# Patient Record
Sex: MALE | Race: WHITE | NOT HISPANIC OR LATINO | URBAN - METROPOLITAN AREA
[De-identification: names, ages, dates, MRNs, and addresses within clinical notes are randomized per-mention and may not be internally consistent; named-entity substitution may affect disease eponyms.]

---

## 2019-09-11 ENCOUNTER — OUTPATIENT (OUTPATIENT)
Dept: OUTPATIENT SERVICES | Facility: HOSPITAL | Age: 74
LOS: 1 days | Discharge: HOME | End: 2019-09-11
Payer: MEDICARE

## 2019-09-11 LAB
ANION GAP SERPL CALC-SCNC: 14 MMO/L — SIGNIFICANT CHANGE UP (ref 7–14)
BUN SERPL-MCNC: 17 MG/DL — SIGNIFICANT CHANGE UP (ref 7–23)
CALCIUM SERPL-MCNC: 9.8 MG/DL — SIGNIFICANT CHANGE UP (ref 8.4–10.5)
CHLORIDE SERPL-SCNC: 101 MMOL/L — SIGNIFICANT CHANGE UP (ref 98–107)
CO2 SERPL-SCNC: 25 MMOL/L — SIGNIFICANT CHANGE UP (ref 22–31)
CREAT SERPL-MCNC: 0.87 MG/DL — SIGNIFICANT CHANGE UP (ref 0.5–1.3)
GLUCOSE BLDC GLUCOMTR-MCNC: 145 MG/DL — HIGH (ref 70–99)
GLUCOSE SERPL-MCNC: 150 MG/DL — HIGH (ref 70–99)
HBA1C BLD-MCNC: 6.4 % — HIGH (ref 4–5.6)
HCT VFR BLD CALC: 43 % — SIGNIFICANT CHANGE UP (ref 39–50)
HGB BLD-MCNC: 13.9 G/DL — SIGNIFICANT CHANGE UP (ref 13–17)
MCHC RBC-ENTMCNC: 30 PG — SIGNIFICANT CHANGE UP (ref 27–34)
MCHC RBC-ENTMCNC: 32.3 % — SIGNIFICANT CHANGE UP (ref 32–36)
MCV RBC AUTO: 92.9 FL — SIGNIFICANT CHANGE UP (ref 80–100)
NRBC # FLD: 0 K/UL — SIGNIFICANT CHANGE UP (ref 0–0)
PLATELET # BLD AUTO: 243 K/UL — SIGNIFICANT CHANGE UP (ref 150–400)
PMV BLD: 9.6 FL — SIGNIFICANT CHANGE UP (ref 7–13)
POTASSIUM SERPL-MCNC: 3.9 MMOL/L — SIGNIFICANT CHANGE UP (ref 3.5–5.3)
POTASSIUM SERPL-SCNC: 3.9 MMOL/L — SIGNIFICANT CHANGE UP (ref 3.5–5.3)
RBC # BLD: 4.63 M/UL — SIGNIFICANT CHANGE UP (ref 4.2–5.8)
RBC # FLD: 14.3 % — SIGNIFICANT CHANGE UP (ref 10.3–14.5)
SODIUM SERPL-SCNC: 140 MMOL/L — SIGNIFICANT CHANGE UP (ref 135–145)
WBC # BLD: 11.3 K/UL — HIGH (ref 3.8–10.5)
WBC # FLD AUTO: 11.3 K/UL — HIGH (ref 3.8–10.5)

## 2019-09-11 PROCEDURE — 93454 CORONARY ARTERY ANGIO S&I: CPT | Mod: 26

## 2019-09-11 PROCEDURE — 93010 ELECTROCARDIOGRAM REPORT: CPT

## 2019-09-11 RX ORDER — SODIUM CHLORIDE 9 MG/ML
3 INJECTION INTRAMUSCULAR; INTRAVENOUS; SUBCUTANEOUS EVERY 8 HOURS
Refills: 0 | Status: DISCONTINUED | OUTPATIENT
Start: 2019-09-11 | End: 2019-10-05

## 2019-09-11 NOTE — H&P CARDIOLOGY - NEGATIVE CARDIOVASCULAR SYMPTOMS
no claudication/no peripheral edema/no orthopnea/no paroxysmal nocturnal dyspnea/no palpitations/no chest pain

## 2019-09-11 NOTE — H&P CARDIOLOGY - RS GEN PE MLT RESP DETAILS PC
good air movement/breath sounds equal/clear to auscultation bilaterally/airway patent/respirations non-labored/no chest wall tenderness

## 2019-09-11 NOTE — H&P CARDIOLOGY - PMH
Afib    CVA (cerebral vascular accident)    Diabetes    Gout    HLD (hyperlipidemia)    HTN (hypertension)    Obesity

## 2019-09-11 NOTE — H&P CARDIOLOGY - HISTORY OF PRESENT ILLNESS
73 y/o M w/ PMH of Afib on Eliquis, CVA, Obesity, Gout, HTN, HLD and DM presents for cardiac catheretization. Patient was sent for a nuclear stress test showed reversible ischemia in the distal + apical anterior wall region with WMA and EF 44%. 75 y/o M w/ PMH of Afib on Eliquis, CVA(2018), Obesity, Gout, HTN, HLD and DM presents for cardiac catheretization. Pt admits to fatigue + dyspnea on exertion and has multiple risk factors for CAD so he was sent for a stress test. Nuclear stress test which showed reversible ischemia in the distal + apical anterior wall region with WMA and EF 44%. Pt denies N/V/D, fevers, chills, cough, palpitations, chest pain, substernal distress, syncope, orthopnea, nocturnal paroxysmal dyspnea, edema, cyanosis, heart murmurs, varicosities, phlebitis, claudication.

## 2021-05-13 PROBLEM — M10.9 GOUT, UNSPECIFIED: Chronic | Status: ACTIVE | Noted: 2019-09-11

## 2021-05-13 PROBLEM — E66.9 OBESITY, UNSPECIFIED: Chronic | Status: ACTIVE | Noted: 2019-09-11

## 2021-05-13 PROBLEM — I10 ESSENTIAL (PRIMARY) HYPERTENSION: Chronic | Status: ACTIVE | Noted: 2019-09-11

## 2021-05-13 PROBLEM — E11.9 TYPE 2 DIABETES MELLITUS WITHOUT COMPLICATIONS: Chronic | Status: ACTIVE | Noted: 2019-09-11

## 2021-05-13 PROBLEM — E78.5 HYPERLIPIDEMIA, UNSPECIFIED: Chronic | Status: ACTIVE | Noted: 2019-09-11

## 2021-05-13 PROBLEM — I48.91 UNSPECIFIED ATRIAL FIBRILLATION: Chronic | Status: ACTIVE | Noted: 2019-09-11

## 2021-05-13 PROBLEM — I63.9 CEREBRAL INFARCTION, UNSPECIFIED: Chronic | Status: ACTIVE | Noted: 2019-09-11

## 2021-05-26 ENCOUNTER — APPOINTMENT (OUTPATIENT)
Dept: PULMONOLOGY | Facility: CLINIC | Age: 76
End: 2021-05-26
Payer: MEDICARE

## 2021-05-26 VITALS
HEART RATE: 80 BPM | WEIGHT: 265 LBS | SYSTOLIC BLOOD PRESSURE: 141 MMHG | TEMPERATURE: 97.1 F | DIASTOLIC BLOOD PRESSURE: 83 MMHG | OXYGEN SATURATION: 94 % | RESPIRATION RATE: 14 BRPM | BODY MASS INDEX: 37.94 KG/M2 | HEIGHT: 70 IN

## 2021-05-26 DIAGNOSIS — I50.9 HEART FAILURE, UNSPECIFIED: ICD-10-CM

## 2021-05-26 DIAGNOSIS — I10 ESSENTIAL (PRIMARY) HYPERTENSION: ICD-10-CM

## 2021-05-26 DIAGNOSIS — U09.9 POST COVID-19 CONDITION, UNSPECIFIED: ICD-10-CM

## 2021-05-26 DIAGNOSIS — Z82.49 FAMILY HISTORY OF ISCHEMIC HEART DISEASE AND OTHER DISEASES OF THE CIRCULATORY SYSTEM: ICD-10-CM

## 2021-05-26 DIAGNOSIS — J84.10 PULMONARY FIBROSIS, UNSPECIFIED: ICD-10-CM

## 2021-05-26 DIAGNOSIS — R06.02 SHORTNESS OF BREATH: ICD-10-CM

## 2021-05-26 DIAGNOSIS — Z87.891 PERSONAL HISTORY OF NICOTINE DEPENDENCE: ICD-10-CM

## 2021-05-26 PROCEDURE — 95012 NITRIC OXIDE EXP GAS DETER: CPT

## 2021-05-26 PROCEDURE — 94060 EVALUATION OF WHEEZING: CPT

## 2021-05-26 PROCEDURE — 99214 OFFICE O/P EST MOD 30 MIN: CPT | Mod: 25

## 2021-05-26 PROCEDURE — 94727 GAS DIL/WSHOT DETER LNG VOL: CPT

## 2021-05-26 NOTE — REASON FOR VISIT
[Consultation] : a consultation [Cough] : cough [Pulmonary Fibrosis] : pulmonary fibrosis [Shortness of Breath] : shortness of breath

## 2021-05-26 NOTE — REVIEW OF SYSTEMS
[Fatigue] : fatigue [Cough] : cough [Hemoptysis] : no hemoptysis [Sputum] : no sputum [Dyspnea] : dyspnea [A.M. Dry Mouth] : no a.m. dry mouth [SOB on Exertion] : sob on exertion [Negative] : Endocrine

## 2021-05-26 NOTE — ASSESSMENT
[FreeTextEntry1] : In summary the patient is a 75-year-old obese male with past medical history significant for atrial fibrillation, hypertension, high cholesterol, diabetes who was referred today for an abnormal CT of the chest.  The patient's physical exam is within normal limits I reviewed the patient's CAT scan report although I am unable to open his study online.  I have requested that the disc be sent to me I will attempt to revisit his CAT scan in the morning.  In all likelihood this fibrosis is secondary to his COVID-19 pneumonia.  The patient underwent a pulmonary function test which revealed restrictive lung disease.  The patient is instructed to continue current therapy and follow-up on a as needed basis.  He may also suffer from obstructive sleep apnea although he denies any symptoms

## 2021-05-26 NOTE — HISTORY OF PRESENT ILLNESS
[Former] : former [Never] : never [TextBox_4] : Patient is a 75-year-old obese male with past medical history significant for COVID-19 pneumonia, atrial fibrillation currently on Eliquis, congestive heart failure, hypertension, diabetes who is referred today for pulmonary consult.  The patient complains of shortness of breath and dyspnea on exertion.  He states this is improved with the increase in his diuretic.  Patient underwent a CT of the chest which was reported as pulmonary fibrosis and he was referred for further management.  He currently denies fevers chills chest pain weight loss or hemoptysis [TextBox_11] : 2

## 2021-10-06 PROBLEM — U09.9 POST-COVID-19 SYNDROME: Status: ACTIVE | Noted: 2021-05-26

## 2022-11-17 ENCOUNTER — EMERGENCY (EMERGENCY)
Facility: HOSPITAL | Age: 77
LOS: 0 days | Discharge: NURSING, REHAB OR LONG-TERM CARE | End: 2022-11-18
Admitting: EMERGENCY MEDICINE

## 2022-11-17 ENCOUNTER — INPATIENT (INPATIENT)
Facility: HOSPITAL | Age: 77
LOS: 0 days | Discharge: HOME | End: 2022-11-18
Attending: INTERNAL MEDICINE | Admitting: INTERNAL MEDICINE

## 2022-11-17 VITALS
SYSTOLIC BLOOD PRESSURE: 153 MMHG | DIASTOLIC BLOOD PRESSURE: 80 MMHG | OXYGEN SATURATION: 99 % | RESPIRATION RATE: 18 BRPM | TEMPERATURE: 98 F | WEIGHT: 149.91 LBS | HEART RATE: 88 BPM

## 2022-11-17 DIAGNOSIS — I48.91 UNSPECIFIED ATRIAL FIBRILLATION: ICD-10-CM

## 2022-11-17 DIAGNOSIS — I50.32 CHRONIC DIASTOLIC (CONGESTIVE) HEART FAILURE: ICD-10-CM

## 2022-11-17 DIAGNOSIS — Z87.19 PERSONAL HISTORY OF OTHER DISEASES OF THE DIGESTIVE SYSTEM: ICD-10-CM

## 2022-11-17 DIAGNOSIS — I11.0 HYPERTENSIVE HEART DISEASE WITH HEART FAILURE: ICD-10-CM

## 2022-11-17 DIAGNOSIS — Z79.01 LONG TERM (CURRENT) USE OF ANTICOAGULANTS: ICD-10-CM

## 2022-11-17 DIAGNOSIS — N39.0 URINARY TRACT INFECTION, SITE NOT SPECIFIED: ICD-10-CM

## 2022-11-17 DIAGNOSIS — R47.1 DYSARTHRIA AND ANARTHRIA: ICD-10-CM

## 2022-11-17 DIAGNOSIS — D64.9 ANEMIA, UNSPECIFIED: ICD-10-CM

## 2022-11-17 DIAGNOSIS — R47.81 SLURRED SPEECH: ICD-10-CM

## 2022-11-17 DIAGNOSIS — K86.1 OTHER CHRONIC PANCREATITIS: ICD-10-CM

## 2022-11-17 DIAGNOSIS — Z79.84 LONG TERM (CURRENT) USE OF ORAL HYPOGLYCEMIC DRUGS: ICD-10-CM

## 2022-11-17 DIAGNOSIS — E11.9 TYPE 2 DIABETES MELLITUS WITHOUT COMPLICATIONS: ICD-10-CM

## 2022-11-17 DIAGNOSIS — R77.8 OTHER SPECIFIED ABNORMALITIES OF PLASMA PROTEINS: ICD-10-CM

## 2022-11-17 DIAGNOSIS — I69.328 OTHER SPEECH AND LANGUAGE DEFICITS FOLLOWING CEREBRAL INFARCTION: ICD-10-CM

## 2022-11-17 DIAGNOSIS — Z20.822 CONTACT WITH AND (SUSPECTED) EXPOSURE TO COVID-19: ICD-10-CM

## 2022-11-17 LAB
APTT BLD: 34.4 SEC — SIGNIFICANT CHANGE UP (ref 27–39.2)
BASOPHILS # BLD AUTO: 0.07 K/UL — SIGNIFICANT CHANGE UP (ref 0–0.2)
BASOPHILS NFR BLD AUTO: 0.8 % — SIGNIFICANT CHANGE UP (ref 0–1)
EOSINOPHIL # BLD AUTO: 0.44 K/UL — SIGNIFICANT CHANGE UP (ref 0–0.7)
EOSINOPHIL NFR BLD AUTO: 4.8 % — SIGNIFICANT CHANGE UP (ref 0–8)
HCT VFR BLD CALC: 31.7 % — LOW (ref 42–52)
HGB BLD-MCNC: 10.3 G/DL — LOW (ref 14–18)
IMM GRANULOCYTES NFR BLD AUTO: 0.7 % — HIGH (ref 0.1–0.3)
INR BLD: 1.61 RATIO — HIGH (ref 0.65–1.3)
LYMPHOCYTES # BLD AUTO: 2.77 K/UL — SIGNIFICANT CHANGE UP (ref 1.2–3.4)
LYMPHOCYTES # BLD AUTO: 30.1 % — SIGNIFICANT CHANGE UP (ref 20.5–51.1)
MCHC RBC-ENTMCNC: 27.6 PG — SIGNIFICANT CHANGE UP (ref 27–31)
MCHC RBC-ENTMCNC: 32.5 G/DL — SIGNIFICANT CHANGE UP (ref 32–37)
MCV RBC AUTO: 85 FL — SIGNIFICANT CHANGE UP (ref 80–94)
MONOCYTES # BLD AUTO: 0.76 K/UL — HIGH (ref 0.1–0.6)
MONOCYTES NFR BLD AUTO: 8.3 % — SIGNIFICANT CHANGE UP (ref 1.7–9.3)
NEUTROPHILS # BLD AUTO: 5.09 K/UL — SIGNIFICANT CHANGE UP (ref 1.4–6.5)
NEUTROPHILS NFR BLD AUTO: 55.3 % — SIGNIFICANT CHANGE UP (ref 42.2–75.2)
NRBC # BLD: 0 /100 WBCS — SIGNIFICANT CHANGE UP (ref 0–0)
PLATELET # BLD AUTO: 288 K/UL — SIGNIFICANT CHANGE UP (ref 130–400)
PROTHROM AB SERPL-ACNC: 18.6 SEC — HIGH (ref 9.95–12.87)
RBC # BLD: 3.73 M/UL — LOW (ref 4.7–6.1)
RBC # FLD: 20 % — HIGH (ref 11.5–14.5)
WBC # BLD: 9.19 K/UL — SIGNIFICANT CHANGE UP (ref 4.8–10.8)
WBC # FLD AUTO: 9.19 K/UL — SIGNIFICANT CHANGE UP (ref 4.8–10.8)

## 2022-11-17 PROCEDURE — 70498 CT ANGIOGRAPHY NECK: CPT | Mod: 26,MA

## 2022-11-17 PROCEDURE — 70450 CT HEAD/BRAIN W/O DYE: CPT | Mod: 26,MA

## 2022-11-17 PROCEDURE — 0042T: CPT | Mod: MA

## 2022-11-17 PROCEDURE — 99285 EMERGENCY DEPT VISIT HI MDM: CPT

## 2022-11-17 PROCEDURE — 70496 CT ANGIOGRAPHY HEAD: CPT | Mod: 26,MA

## 2022-11-17 PROCEDURE — 93010 ELECTROCARDIOGRAM REPORT: CPT

## 2022-11-17 NOTE — ED PROVIDER NOTE - NS ED ROS FT
Constitutional: No fevers, chills, or malaise.  HEENT: No headache, visual changes   Cardiac:  No chest pain, SOB   Respiratory:  No cough, respiratory distress, or hemoptysis.  GI:  No nausea, vomiting, diarrhea, or abdominal pain.  :  No dysuria, frequency, or urgency.  MS:  No myalgia, muscle weakness, joint pain or back pain.  Neuro:  Reports slurred speech. No dizziness, LOC, paralysis, or N/T.  Skin:  No skin rash.   Endocrine: No polyuria, polyphagia, or polydipsia.

## 2022-11-17 NOTE — CONSULT NOTE ADULT - SUBJECTIVE AND OBJECTIVE BOX
Stroke CODE CONSULT Note:    GEORGINA PAUL    1. Chief Complaint:    HPI: 77Y male with PMHx of ischemic strokes (last one in 2022 residual slurred speech), afib on Eliquis, history of GI bleed, DM BIBEMS for slurred speech. Patient lives at [facility]. ED attending called wife and staff, patient was seen normal around ~6-7pm for dinner time. Patient went to shower ?7PM and was placed in the chair and fell asleep. Around ~830PM, patient was noted to have worsening slurred speech. LKW was unclear. Patient also took his Eliquis dose at 5pm, confirmed with facility.       2. Relevant PMH:   Prior ischemic stroke/TIA[ ], Afib [ ], CAD [ ], HTN [ ], DLD [ ], DM [ ], PVD [ ], Obesity [ ],   Sedentary lifestyle [ ], CHF [ ], PRABHAKAR [ ], Cancer Hx [ ].    3. Social History: Smoking [ ], Drug Use [ ], Alcohol Use [ ], Other [ ]    4. Possible Location of Stroke:    5. Relevant Brain Tissue Imaging:  < from: CT Brain Stroke Protocol (22 @ 22:34) >  IMPRESSION:    No evidence of acute intracranial hemorrhage or large territorial infarct.    Mild/moderate chronic microvascular changes.    Findings were discussed by Dr. James Clifton with Dr. Jacobson at 2022   10:44 PM.    < end of copied text >    6. Relevant Cerebrovascular Imaging:    < from: CT Angio Neck Stroke Protocol w/ IV Cont (22 @ 23:00) >  IMPRESSION:    68 mL perfusion abnormality scattered throughout the bilateral   cerebellar, bilateral occipital, bilateral frontal, and left parietal  regions. Likely artifactual given lack of vascular territorial   distribution and abnormal perfusion curves.    No intracranial large vessel filling defect or small aneurysm.    Mild stenosis of right internal cervical carotid artery origin.        ******PRELIMINARY REPORT******      ******PRELIMINARY REPORT******    < end of copied text >              7. Relevant blood tests:                          10.3   9.19  )-----------( 288      ( 2022 23:16 )             31.7     8. Relevant cardiac rhythm monitorin. Relevant Cardiac Structure: (TTE/FABIÁN +/-):[ ]No intracardiac thrombus/[ ] no vegetation/[ ]no akynesia/EF:    Home Medications:      MEDICATIONS  (STANDING):      10. PT/OT/Speech/Rehab/S&Sw/ Cognitive eval results and recommendations:    11. Exam:    Vital Signs Last 24 Hrs  T(C): 36.6 (2022 22:07), Max: 36.6 (2022 22:07)  T(F): 97.8 (2022 22:07), Max: 97.8 (2022 22:07)  HR: 88 (:07) (88 - 88)  BP: 153/80 (2022 22:07) (153/80 - 153/80)  BP(mean): --  RR: 18 (2022 22:07) (18 - 18)  SpO2: 99% (2022 22:07) (99% - 99%)    Parameters below as of 2022 22:07  Patient On (Oxygen Delivery Method): room air        12.   NIH STROKE SCALE  Item	                                                        Score  1 a.	Level of Consciousness	               	0  1 b. LOC Questions	                                0  1 c.	LOC Commands	                               	0  2.	Best Gaze	                                        0  3.	Visual	                                                0  4.	Facial Palsy	                                        0  5 a.	Motor Arm - Left	                                0  5 b.	Motor Arm - Right	                        0  6 a.	Motor Leg - Left	                                0  6 b.	Motor Leg - Right	                                0  7.	Limb Ataxia	                                        0  8.	Sensory	                                                0  9.	Language	                                        0  10.	Dysarthria	                                        1-->0  11.	Extinction and Inattention  	        0  ______________________________________  TOTAL	                                                        0    Total NIHSS on admission:      NIHSS yesterday:      NIHSS today: 0

## 2022-11-17 NOTE — ED PROVIDER NOTE - CLINICAL SUMMARY MEDICAL DECISION MAKING FREE TEXT BOX
slurred speechk fatigue, stroke code. not tpa candidate  NIHSS zero.   elevated trop. in afib (known). no cp or sob  admitted to tele for serial trop, cardiac eval and MRI  neuro to follow.

## 2022-11-17 NOTE — ED PROVIDER NOTE - PROGRESS NOTE DETAILS
leeannk: d/w Dr Martinez from telestroke. given LKN unclear if from 6pm-730pm, sxs improving, took AC around 530-6pm today and NIHSS 0, does not feel tpa candidate. I agree. plan for labs, CTs and reassess. Suzy: per MAR, medicine team will f/u UA and CXR. Admitted to [facility] for elevated trop

## 2022-11-17 NOTE — ED PROVIDER NOTE - OBJECTIVE STATEMENT
76 yo male w/ PMH of afib on eliquis, DM, GI bleed, stroke vs TIA in august presents for slurred speech.  Per EMS, last known well 2030 but speaking to assisted living facility, wife, and pt, last well known unclear.  Per pt, when he woke up from sleep at 2030, noticed speech was slurred.  No fevers, paralysis, or trauma.

## 2022-11-17 NOTE — ED PROVIDER NOTE - PHYSICAL EXAMINATION
GENERAL: NAD   SKIN: warm, dry  HEAD: Normocephalic; atraumatic.  EYES: PERRLA, EOMI, no conjunctival erythema  CARD: S1, S2 normal; no murmurs, gallops, or rubs. Regular rate and rhythm.   RESP: LCTAB; No wheezes, rales, rhonchi, or stridor.  ABD: soft, nontender, and nondistended  NEURO: Alert, oriented. Mild dysarthria noted. Strength 5/5 in bilateral UE and LEs, sensation intact. No facial droop. Finger nose finger and rapid alternating movements WNL. No pronator drift. NIH 1   PSYCH: Cooperative, appropriate.

## 2022-11-17 NOTE — CHART NOTE - NSCHARTNOTEFT_GEN_A_CORE
77 year old male w/ PMH of Afib on Eliquis, Ischemic stroke in August 2022, presents with slurred speech. Patient was reportedly in usual state of health at 1800 11/17 when he was laying in his recliner as witnessed by health aid. Shortly thereafter, he was noted to have slurring of his speech which has improved en route to the hospital but not quite back to baseline. NIHSS 1 (mild dysarthria). CTH (to my eye) no acute pathology; Chronic L. anterior limb of internal capsule infarction; Chronic microvascular changes. CTA h/n (to my eye) negative for any stenoocclusive disease. CTP indeterminate, but likely artifactual.     No tpa as outside window and on Eliquis (last dose this evening at 5pm)   No MT as no LVO    Impression: Isolated dysarthria which is non localizing, likely secondary to acute ischemic stroke. Mechanism unkonwn due to incomplete workup.     [facility]

## 2022-11-17 NOTE — ED PROVIDER NOTE - ATTENDING CONTRIBUTION TO CARE
77-year-old male with a past medical history of atrial fibrillation on anticoagulation, last took Eliquis around 5:36 PM today, diabetes, GI bleed in June of this year, TIA versus stroke in August of this year presents as stroke code.  Per EMS last known normal 830 but unclear last known normal when speaking to his assisted living facility and wife.  Came back from dinner around 6-7 and took a shower, was placed in his chair after that and at some point fell asleep.  When he woke up around 830 to 9 PM was slurring his speech and not his normal self per the wife.  Unclear when everything changed and she is unsure of the exact timelines.  On exam patient states that his speech is improved, NIH stroke scale 0 for me, vital signs stable otherwise, nontoxic-appearing.  No ICH or acute stroke on CT noncontrast.  CTAs and labs pending.  CT progress note for further details regarding communication with telestroke.  Disposition pending work-up and reassessment.

## 2022-11-17 NOTE — STROKE CODE NOTE - ABCD2 HIDDEN
I spoke with patient reminding him to have blood drawn tomorrow. He states he had blood drawn yesterday. He will await instructions from that.
show

## 2022-11-17 NOTE — CONSULT NOTE ADULT - NS ATTEND AMEND GEN_ALL_CORE FT
I have personally seen and examined this patient.  I have fully participated in the care of this patient.  I have reviewed all pertinent clinical information, including history, physical exam, plan and note. : 77Y male with PMHx of ischemic strokes (last one in August 2022 residual slurred speech), afib on Eliquis, history of GI bleed, DM presented with worsening slurred speech. Overnight Brain MRI showed no acute infarct. UA positive. Likely exacerbation of prior stroke symptoms due to UTI. Continue Eliquis. No further work up from neurology standpoint.    I have reviewed all pertinent clinical information and reviewed all relevant imaging and diagnostic studies personally.  Recommendations as above.  Agree with above assessment except as noted.

## 2022-11-17 NOTE — CONSULT NOTE ADULT - ASSESSMENT
Assessment: 77Y male with PMHx of ischemic strokes (last one in August 2022 residual slurred speech), afib on Eliquis, history of GI bleed, DM BIBEMS for slurred speech. Patient lives at [facility]. ED attending called wife and staff, patient was seen normal around ~6-7pm for dinner time. Patient went to shower ?7PM and was placed in the chair and fell asleep. Around ~830PM, patient was noted to have worsening slurred speech. LKW was unclear. Patient also took his Eliquis dose at 5pm, confirmed with facility. . CTH negative for acute findings. CTA- no flow limiting stenosis or aneurysms. CTP-68 mL perfusion abnormality scattered throughout the bilateral cerebellar, bilateral occipital, bilateral frontal, and left parietalregions. Likely artifactual given lack of vascular territorial distribution and abnormal perfusion curves. Patient's symptoms were rapidly improving, patient c/o of dry mouth. Discussed case with CTC attending, Dr. Martinez, patient is not a tpa candidate as LKW is unclear and patient took his Eliquis dose (abnormal coags).       Suggestions:  Infectious workup- CBC, Chest X RAY, UA   Dysphagia screen  Continue home meds  MRI brain contrast  rEEG  Patient can go to ed obs from neuro standpoint     **Pending attending note to follow below for final reccomendatios** Assessment: 77Y male with PMHx of ischemic strokes (last one in August 2022 residual slurred speech), afib on Eliquis, history of GI bleed, DM BIBEMS for slurred speech. Patient lives at [facility]. ED attending called wife and staff, patient was seen normal around ~6-7pm for dinner time. Patient went to shower ?7PM and was placed in the chair and fell asleep. Around ~830PM, patient was noted to have worsening slurred speech. LKW was unclear. Patient also took his Eliquis dose at 5pm, confirmed with facility. . CTH negative for acute findings. CTA- no flow limiting stenosis or aneurysms. CTP-68 mL perfusion abnormality scattered throughout the bilateral cerebellar, bilateral occipital, bilateral frontal, and left parietalregions. Likely artifactual given lack of vascular territorial distribution and abnormal perfusion curves. Patient's symptoms were rapidly improving, patient c/o of dry mouth. Discussed case with CTC attending, Dr. Martinez, patient is not a tpa candidate as LKW is unclear and patient took his Eliquis dose (abnormal coags).       Suggestions:  Infectious workup- CBC, Chest X RAY, UA   Dysphagia screen  Continue home meds  MRI brain contrast  rEEG  q4 neurochecks  Patient can go to ed obs from neuro standpoint     **Pending attending note to follow below for final reccomendatios** Assessment: 77Y male with PMHx of ischemic strokes (last one in August 2022 residual slurred speech), afib on Eliquis, history of GI bleed, DM BIBEMS for slurred speech. Patient lives at [facility]. ED attending called wife and staff, patient was seen normal around ~6-7pm for dinner time. Patient went to shower ?7PM and was placed in the chair and fell asleep. Around ~830PM, patient was noted to have worsening slurred speech. LKW was unclear. Patient also took his Eliquis dose at 5pm, confirmed with facility. . CTH negative for acute findings. CTA- no flow limiting stenosis or aneurysms. CTP-68 mL perfusion abnormality scattered throughout the bilateral cerebellar, bilateral occipital, bilateral frontal, and left parietalregions. Likely artifactual given lack of vascular territorial distribution and abnormal perfusion curves. Patient's symptoms were rapidly improving, patient c/o of dry mouth. Discussed case with CTC attending, Dr. Martinez, patient is not a tpa candidate as LKW is unclear and patient took his Eliquis dose (abnormal coags).       Suggestions:  Infectious workup- CBC, Chest X RAY, UA   Dysphagia screen  Continue home meds  MRI brain contrast  Patient can go to ed obs from neuro standpoint     **Pending attending note to follow below for final reccomendatios** Assessment: 77Y male with PMHx of ischemic strokes (last one in August 2022 residual slurred speech), afib on Eliquis, history of GI bleed, DM BIBEMS for slurred speech. Patient lives at [facility]. ED attending called wife and staff, patient was seen normal around ~6-7pm for dinner time. Patient went to shower ?7PM and was placed in the chair and fell asleep. Around ~830PM, patient was noted to have worsening slurred speech. LKW was unclear. Patient also took his Eliquis dose at 5pm, confirmed with facility. . CTH negative for acute findings. CTA- no flow limiting stenosis or aneurysms. CTP-68 mL perfusion abnormality scattered throughout the bilateral cerebellar, bilateral occipital, bilateral frontal, and left parietalregions. Likely artifactual given lack of vascular territorial distribution and abnormal perfusion curves. Patient's symptoms were rapidly improving, patient c/o of dry mouth. Discussed case with CTC attending, Dr. Martinez, patient is not a tpa candidate as LKW is unclear and patient took his Eliquis dose (abnormal coags).     #Could be related to recrudescence of old stroke    Suggestions:  Infectious workup- CBC, Chest X RAY, UA   Cardiac work up for elevated troponin  Dysphagia screen  Continue home meds  MRI brain contrast  Patient can go to ed obs from neuro standpoint     **Pending attending note to follow below for final reccomendatios** Assessment: 77Y male with PMHx of ischemic strokes (last one in August 2022 residual slurred speech), afib on Eliquis, history of GI bleed, DM BIBEMS for slurred speech. Patient lives at [facility]. ED attending called wife and staff, patient was seen normal around ~6-7pm for dinner time. Patient went to shower ?7PM and was placed in the chair and fell asleep. Around ~830PM, patient was noted to have worsening slurred speech. LKW was unclear. Patient also took his Eliquis dose at 5pm, confirmed with facility. . CTH negative for acute findings. CTA- no flow limiting stenosis or aneurysms. CTP-68 mL perfusion abnormality scattered throughout the bilateral cerebellar, bilateral occipital, bilateral frontal, and left parietalregions. Likely artifactual given lack of vascular territorial distribution and abnormal perfusion curves. Patient's symptoms were rapidly improving, patient c/o of dry mouth. Discussed case with CTC attending, Dr. Martinez, patient is not a tpa candidate as LKW is unclear and patient took his Eliquis dose (abnormal coags).     #Could be related to recrudescence of old stroke    Suggestions:  Infectious workup- CBC, Chest X RAY, UA   Cardiac work up for elevated troponin  Dysphagia screen  Continue home meds  MRI brain contrast  Discussed with Dr. Ramirez on call  Patient can go to ed obs from neuro standpoint     **Pending neurology attending note to follow below for final recommendations Assessment: 77Y male with PMHx of ischemic strokes (last one in August 2022 residual slurred speech), afib on Eliquis, history of GI bleed, DM BIBEMS for slurred speech. Patient lives at [facility]. ED attending called wife and staff, patient was seen normal around ~6-7pm for dinner time. Patient went to shower ?7PM and was placed in the chair and fell asleep. Around ~830PM, patient was noted to have worsening slurred speech. LKW was unclear. Patient also took his Eliquis dose at 5pm, confirmed with facility. . CTH negative for acute findings. CTA- no flow limiting stenosis or aneurysms. CTP-68 mL perfusion abnormality scattered throughout the bilateral cerebellar, bilateral occipital, bilateral frontal, and left parietalregions. Likely artifactual given lack of vascular territorial distribution and abnormal perfusion curves. Patient's symptoms were rapidly improving, patient c/o of dry mouth. Discussed case with CTC attending, Dr. Martinez, patient is not a tpa candidate as LKW is unclear and patient took his Eliquis dose (abnormal coags).     #Could be related to recrudescence of old stroke    Suggestions:  Infectious workup- CBC, Chest X RAY, UA   Cardiac work up for elevated troponin  Dysphagia screen  Continue home meds  MRI brain contrast  Discussed with Dr. aRmirez on call  Patient can go to ed obs from neuro standpoint

## 2022-11-17 NOTE — ED PROVIDER NOTE - INTERPRETATION
Irregularly irregular, atrial fibrillation, frequent PVCs, normal axis, slight T wave abnormalities throughout but no ST depressions or elevation.  QTc 472, QRS 88.  Rate controlled.

## 2022-11-18 ENCOUNTER — TRANSCRIPTION ENCOUNTER (OUTPATIENT)
Age: 77
End: 2022-11-18

## 2022-11-18 VITALS
RESPIRATION RATE: 18 BRPM | TEMPERATURE: 98 F | OXYGEN SATURATION: 97 % | DIASTOLIC BLOOD PRESSURE: 75 MMHG | HEART RATE: 85 BPM | SYSTOLIC BLOOD PRESSURE: 157 MMHG

## 2022-11-18 LAB
A1C WITH ESTIMATED AVERAGE GLUCOSE RESULT: 6.3 % — HIGH (ref 4–5.6)
ALBUMIN SERPL ELPH-MCNC: 2.9 G/DL — LOW (ref 3.5–5.2)
ALBUMIN SERPL ELPH-MCNC: 3.5 G/DL — SIGNIFICANT CHANGE UP (ref 3.5–5.2)
ALP SERPL-CCNC: 107 U/L — SIGNIFICANT CHANGE UP (ref 30–115)
ALP SERPL-CCNC: 53 U/L — SIGNIFICANT CHANGE UP (ref 30–115)
ALT FLD-CCNC: 6 U/L — SIGNIFICANT CHANGE UP (ref 0–41)
ALT FLD-CCNC: 7 U/L — SIGNIFICANT CHANGE UP (ref 0–41)
ANION GAP SERPL CALC-SCNC: 10 MMOL/L — SIGNIFICANT CHANGE UP (ref 7–14)
ANION GAP SERPL CALC-SCNC: 12 MMOL/L — SIGNIFICANT CHANGE UP (ref 7–14)
APPEARANCE UR: ABNORMAL
AST SERPL-CCNC: 13 U/L — SIGNIFICANT CHANGE UP (ref 0–41)
AST SERPL-CCNC: 14 U/L — SIGNIFICANT CHANGE UP (ref 0–41)
BACTERIA # UR AUTO: NEGATIVE — SIGNIFICANT CHANGE UP
BASOPHILS # BLD AUTO: 0.07 K/UL — SIGNIFICANT CHANGE UP (ref 0–0.2)
BASOPHILS NFR BLD AUTO: 0.9 % — SIGNIFICANT CHANGE UP (ref 0–1)
BILIRUB SERPL-MCNC: 0.3 MG/DL — SIGNIFICANT CHANGE UP (ref 0.2–1.2)
BILIRUB SERPL-MCNC: 0.3 MG/DL — SIGNIFICANT CHANGE UP (ref 0.2–1.2)
BILIRUB UR-MCNC: NEGATIVE — SIGNIFICANT CHANGE UP
BUN SERPL-MCNC: 17 MG/DL — SIGNIFICANT CHANGE UP (ref 10–20)
BUN SERPL-MCNC: 20 MG/DL — SIGNIFICANT CHANGE UP (ref 10–20)
CALCIUM SERPL-MCNC: 8.5 MG/DL — SIGNIFICANT CHANGE UP (ref 8.4–10.5)
CALCIUM SERPL-MCNC: 8.8 MG/DL — SIGNIFICANT CHANGE UP (ref 8.4–10.5)
CHLORIDE SERPL-SCNC: 105 MMOL/L — SIGNIFICANT CHANGE UP (ref 98–110)
CHLORIDE SERPL-SCNC: 108 MMOL/L — SIGNIFICANT CHANGE UP (ref 98–110)
CHOLEST SERPL-MCNC: 99 MG/DL — SIGNIFICANT CHANGE UP
CO2 SERPL-SCNC: 26 MMOL/L — SIGNIFICANT CHANGE UP (ref 17–32)
CO2 SERPL-SCNC: 27 MMOL/L — SIGNIFICANT CHANGE UP (ref 17–32)
COLOR SPEC: COLORLESS — SIGNIFICANT CHANGE UP
CREAT SERPL-MCNC: 1.3 MG/DL — SIGNIFICANT CHANGE UP (ref 0.7–1.5)
CREAT SERPL-MCNC: 1.3 MG/DL — SIGNIFICANT CHANGE UP (ref 0.7–1.5)
DIFF PNL FLD: NEGATIVE — SIGNIFICANT CHANGE UP
EGFR: 56 ML/MIN/1.73M2 — LOW
EGFR: 56 ML/MIN/1.73M2 — LOW
EOSINOPHIL # BLD AUTO: 0.48 K/UL — SIGNIFICANT CHANGE UP (ref 0–0.7)
EOSINOPHIL NFR BLD AUTO: 5.8 % — SIGNIFICANT CHANGE UP (ref 0–8)
EPI CELLS # UR: 0 /HPF — SIGNIFICANT CHANGE UP (ref 0–5)
ESTIMATED AVERAGE GLUCOSE: 134 MG/DL — HIGH (ref 68–114)
GLUCOSE BLDC GLUCOMTR-MCNC: 115 MG/DL — HIGH (ref 70–99)
GLUCOSE BLDC GLUCOMTR-MCNC: 144 MG/DL — HIGH (ref 70–99)
GLUCOSE SERPL-MCNC: 138 MG/DL — HIGH (ref 70–99)
GLUCOSE SERPL-MCNC: 99 MG/DL — SIGNIFICANT CHANGE UP (ref 70–99)
GLUCOSE UR QL: NEGATIVE — SIGNIFICANT CHANGE UP
HCT VFR BLD CALC: 30.2 % — LOW (ref 42–52)
HDLC SERPL-MCNC: 42 MG/DL — SIGNIFICANT CHANGE UP
HGB BLD-MCNC: 9.3 G/DL — LOW (ref 14–18)
HYALINE CASTS # UR AUTO: 0 /LPF — SIGNIFICANT CHANGE UP (ref 0–7)
IMM GRANULOCYTES NFR BLD AUTO: 0.5 % — HIGH (ref 0.1–0.3)
IRON SATN MFR SERPL: 14 % — LOW (ref 15–50)
IRON SATN MFR SERPL: 32 UG/DL — LOW (ref 35–150)
KETONES UR-MCNC: NEGATIVE — SIGNIFICANT CHANGE UP
LEUKOCYTE ESTERASE UR-ACNC: ABNORMAL
LIPID PNL WITH DIRECT LDL SERPL: 38 MG/DL — SIGNIFICANT CHANGE UP
LYMPHOCYTES # BLD AUTO: 2.11 K/UL — SIGNIFICANT CHANGE UP (ref 1.2–3.4)
LYMPHOCYTES # BLD AUTO: 25.7 % — SIGNIFICANT CHANGE UP (ref 20.5–51.1)
MCHC RBC-ENTMCNC: 26.6 PG — LOW (ref 27–31)
MCHC RBC-ENTMCNC: 30.8 G/DL — LOW (ref 32–37)
MCV RBC AUTO: 86.3 FL — SIGNIFICANT CHANGE UP (ref 80–94)
MONOCYTES # BLD AUTO: 0.78 K/UL — HIGH (ref 0.1–0.6)
MONOCYTES NFR BLD AUTO: 9.5 % — HIGH (ref 1.7–9.3)
NEUTROPHILS # BLD AUTO: 4.73 K/UL — SIGNIFICANT CHANGE UP (ref 1.4–6.5)
NEUTROPHILS NFR BLD AUTO: 57.6 % — SIGNIFICANT CHANGE UP (ref 42.2–75.2)
NITRITE UR-MCNC: NEGATIVE — SIGNIFICANT CHANGE UP
NON HDL CHOLESTEROL: 57 MG/DL — SIGNIFICANT CHANGE UP
NRBC # BLD: 0 /100 WBCS — SIGNIFICANT CHANGE UP (ref 0–0)
PH UR: 6.5 — SIGNIFICANT CHANGE UP (ref 5–8)
PLATELET # BLD AUTO: 282 K/UL — SIGNIFICANT CHANGE UP (ref 130–400)
POTASSIUM SERPL-MCNC: 3.9 MMOL/L — SIGNIFICANT CHANGE UP (ref 3.5–5)
POTASSIUM SERPL-MCNC: 4.2 MMOL/L — SIGNIFICANT CHANGE UP (ref 3.5–5)
POTASSIUM SERPL-SCNC: 3.9 MMOL/L — SIGNIFICANT CHANGE UP (ref 3.5–5)
POTASSIUM SERPL-SCNC: 4.2 MMOL/L — SIGNIFICANT CHANGE UP (ref 3.5–5)
PROT SERPL-MCNC: 5.8 G/DL — LOW (ref 6–8)
PROT SERPL-MCNC: 6.4 G/DL — SIGNIFICANT CHANGE UP (ref 6–8)
PROT UR-MCNC: NEGATIVE — SIGNIFICANT CHANGE UP
RBC # BLD: 3.5 M/UL — LOW (ref 4.7–6.1)
RBC # FLD: 20.1 % — HIGH (ref 11.5–14.5)
RBC CASTS # UR COMP ASSIST: 6 /HPF — HIGH (ref 0–4)
SARS-COV-2 RNA SPEC QL NAA+PROBE: SIGNIFICANT CHANGE UP
SODIUM SERPL-SCNC: 142 MMOL/L — SIGNIFICANT CHANGE UP (ref 135–146)
SODIUM SERPL-SCNC: 146 MMOL/L — SIGNIFICANT CHANGE UP (ref 135–146)
SP GR SPEC: 1.01 — SIGNIFICANT CHANGE UP (ref 1.01–1.03)
TIBC SERPL-MCNC: 227 UG/DL — SIGNIFICANT CHANGE UP (ref 220–430)
TRIGL SERPL-MCNC: 99 MG/DL — SIGNIFICANT CHANGE UP
TROPONIN T SERPL-MCNC: 0.03 NG/ML — CRITICAL HIGH
TROPONIN T SERPL-MCNC: 0.03 NG/ML — CRITICAL HIGH
TSH SERPL-MCNC: 3.1 UIU/ML — SIGNIFICANT CHANGE UP (ref 0.27–4.2)
UIBC SERPL-MCNC: 195 UG/DL — SIGNIFICANT CHANGE UP (ref 110–370)
UROBILINOGEN FLD QL: SIGNIFICANT CHANGE UP
WBC # BLD: 8.21 K/UL — SIGNIFICANT CHANGE UP (ref 4.8–10.8)
WBC # FLD AUTO: 8.21 K/UL — SIGNIFICANT CHANGE UP (ref 4.8–10.8)
WBC UR QL: 196 /HPF — HIGH (ref 0–5)

## 2022-11-18 PROCEDURE — 93010 ELECTROCARDIOGRAM REPORT: CPT | Mod: 76

## 2022-11-18 PROCEDURE — 70551 MRI BRAIN STEM W/O DYE: CPT | Mod: 26

## 2022-11-18 PROCEDURE — 99221 1ST HOSP IP/OBS SF/LOW 40: CPT

## 2022-11-18 PROCEDURE — 99239 HOSP IP/OBS DSCHRG MGMT >30: CPT

## 2022-11-18 PROCEDURE — 71045 X-RAY EXAM CHEST 1 VIEW: CPT | Mod: 26

## 2022-11-18 RX ORDER — LIPASE/PROTEASE/AMYLASE 16-48-48K
1 CAPSULE,DELAYED RELEASE (ENTERIC COATED) ORAL
Refills: 0 | Status: DISCONTINUED | OUTPATIENT
Start: 2022-11-18 | End: 2022-11-18

## 2022-11-18 RX ORDER — CEFPODOXIME PROXETIL 100 MG
1 TABLET ORAL
Qty: 14 | Refills: 0
Start: 2022-11-18 | End: 2022-11-24

## 2022-11-18 RX ORDER — LATANOPROST 0.05 MG/ML
1 SOLUTION/ DROPS OPHTHALMIC; TOPICAL AT BEDTIME
Refills: 0 | Status: DISCONTINUED | OUTPATIENT
Start: 2022-11-18 | End: 2022-11-18

## 2022-11-18 RX ORDER — APIXABAN 2.5 MG/1
5 TABLET, FILM COATED ORAL EVERY 12 HOURS
Refills: 0 | Status: DISCONTINUED | OUTPATIENT
Start: 2022-11-18 | End: 2022-11-18

## 2022-11-18 RX ORDER — ONDANSETRON 8 MG/1
4 TABLET, FILM COATED ORAL EVERY 8 HOURS
Refills: 0 | Status: DISCONTINUED | OUTPATIENT
Start: 2022-11-18 | End: 2022-11-18

## 2022-11-18 RX ORDER — CEFTRIAXONE 500 MG/1
1000 INJECTION, POWDER, FOR SOLUTION INTRAMUSCULAR; INTRAVENOUS EVERY 24 HOURS
Refills: 0 | Status: COMPLETED | OUTPATIENT
Start: 2022-11-18 | End: 2022-11-18

## 2022-11-18 RX ORDER — METOPROLOL TARTRATE 50 MG
25 TABLET ORAL EVERY 8 HOURS
Refills: 0 | Status: DISCONTINUED | OUTPATIENT
Start: 2022-11-18 | End: 2022-11-18

## 2022-11-18 RX ORDER — METOPROLOL TARTRATE 50 MG
25 TABLET ORAL
Refills: 0 | Status: DISCONTINUED | OUTPATIENT
Start: 2022-11-18 | End: 2022-11-18

## 2022-11-18 RX ORDER — ATORVASTATIN CALCIUM 80 MG/1
20 TABLET, FILM COATED ORAL AT BEDTIME
Refills: 0 | Status: DISCONTINUED | OUTPATIENT
Start: 2022-11-18 | End: 2022-11-18

## 2022-11-18 RX ORDER — ASPIRIN/CALCIUM CARB/MAGNESIUM 324 MG
1 TABLET ORAL
Qty: 0 | Refills: 0 | DISCHARGE
Start: 2022-11-18

## 2022-11-18 RX ORDER — FUROSEMIDE 40 MG
40 TABLET ORAL DAILY
Refills: 0 | Status: DISCONTINUED | OUTPATIENT
Start: 2022-11-18 | End: 2022-11-18

## 2022-11-18 RX ORDER — ASPIRIN/CALCIUM CARB/MAGNESIUM 324 MG
81 TABLET ORAL DAILY
Refills: 0 | Status: DISCONTINUED | OUTPATIENT
Start: 2022-11-18 | End: 2022-11-18

## 2022-11-18 RX ORDER — FLUTICASONE PROPIONATE 50 MCG
2 SPRAY, SUSPENSION NASAL
Refills: 0 | Status: DISCONTINUED | OUTPATIENT
Start: 2022-11-18 | End: 2022-11-18

## 2022-11-18 RX ORDER — LANOLIN ALCOHOL/MO/W.PET/CERES
3 CREAM (GRAM) TOPICAL AT BEDTIME
Refills: 0 | Status: DISCONTINUED | OUTPATIENT
Start: 2022-11-18 | End: 2022-11-18

## 2022-11-18 RX ORDER — LOSARTAN POTASSIUM 100 MG/1
50 TABLET, FILM COATED ORAL DAILY
Refills: 0 | Status: DISCONTINUED | OUTPATIENT
Start: 2022-11-18 | End: 2022-11-18

## 2022-11-18 RX ORDER — INSULIN LISPRO 100/ML
VIAL (ML) SUBCUTANEOUS
Refills: 0 | Status: DISCONTINUED | OUTPATIENT
Start: 2022-11-18 | End: 2022-11-18

## 2022-11-18 RX ORDER — ACETAMINOPHEN 500 MG
650 TABLET ORAL EVERY 6 HOURS
Refills: 0 | Status: DISCONTINUED | OUTPATIENT
Start: 2022-11-18 | End: 2022-11-18

## 2022-11-18 RX ADMIN — LOSARTAN POTASSIUM 50 MILLIGRAM(S): 100 TABLET, FILM COATED ORAL at 05:44

## 2022-11-18 RX ADMIN — Medication 2 SPRAY(S): at 05:45

## 2022-11-18 RX ADMIN — CEFTRIAXONE 100 MILLIGRAM(S): 500 INJECTION, POWDER, FOR SOLUTION INTRAMUSCULAR; INTRAVENOUS at 13:39

## 2022-11-18 RX ADMIN — Medication 1 CAPSULE(S): at 11:03

## 2022-11-18 RX ADMIN — Medication 40 MILLIGRAM(S): at 05:43

## 2022-11-18 RX ADMIN — APIXABAN 5 MILLIGRAM(S): 2.5 TABLET, FILM COATED ORAL at 05:43

## 2022-11-18 RX ADMIN — Medication 25 MILLIGRAM(S): at 05:43

## 2022-11-18 RX ADMIN — Medication 81 MILLIGRAM(S): at 11:03

## 2022-11-18 NOTE — DISCHARGE NOTE PROVIDER - NSDCFUADDINST_GEN_ALL_CORE_FT
You were evaluated in the hospital for your worsened slurred speech. You had a stroke workup including CAT scan of your head as well as an MRI which did not show evidence of new stroke(s).   Please follow up with your primary care provider by calling them to make an appointment so that you can see them in 1-2weeks; bring your paperwork from this hospital stay to that visit. You may also address your borderline low iron level (32) at that visit.   In the meantime please take all the medications you were discharged with, unless otherwise instructed by your healthcare provider(s).   Seek immediate medical attention if you develop fevers, chills, chest pain, shortness of breath, nausea and vomiting, abdominal pain, passing out, weakness or numbness or tingling on one side of your body, or any other concerning signs or symptoms.  You were evaluated in the hospital for your worsened slurred speech. You had a stroke workup including CAT scan of your head as well as an MRI which did not show evidence of new stroke(s).   On discharge:  - Continue all home medications - Take Vantin 100mg twice a day for 7 days for a urinary tract infection  Please follow up with your primary care provider by calling them to make an appointment so that you can see them in 1-2weeks; bring your paperwork from this hospital stay to that visit. You may also address your borderline low iron level (32) at that visit.   In the meantime please take all the medications you were discharged with, unless otherwise instructed by your healthcare provider(s).   Seek immediate medical attention if you develop fevers, chills, chest pain, shortness of breath, nausea and vomiting, abdominal pain, passing out, weakness or numbness or tingling on one side of your body, or any other concerning signs or symptoms. You were evaluated in the hospital for your worsened slurred speech. You had a stroke workup including CAT scan of your head as well as an MRI which did not show evidence of new stroke(s).   On discharge:  - Continue all home medications - Take Vantin 100mg twice a day for 7 days for a urinary tract infection  Please follow up with your primary care provider and neurologist by calling them to make an appointment so that you can see them in 1-2weeks; bring your paperwork from this hospital stay to that visit. You may also address your borderline low iron level (32) at that visit.   In the meantime please take all the medications you were discharged with, unless otherwise instructed by your healthcare provider(s).   Seek immediate medical attention if you develop fevers, chills, chest pain, shortness of breath, nausea and vomiting, abdominal pain, passing out, weakness or numbness or tingling on one side of your body, or any other concerning signs or symptoms. You were evaluated in the hospital for your worsened slurred speech. You had a stroke workup including CAT scan of your head as well as an MRI which did not show evidence of new stroke(s).   On discharge:  - Continue all home medications - Take Vantin 100mg twice a day for 7 days for a urinary tract infection  - Follow up with primary care, Neurology, and Gastroenterology.  Please follow up with your primary care provider and neurologist by calling them to make an appointment so that you can see them in 1-2weeks; bring your paperwork from this hospital stay to that visit. You may also address your borderline low iron level (32) at that visit.   In the meantime please take all the medications you were discharged with, unless otherwise instructed by your healthcare provider(s).   Seek immediate medical attention if you develop fevers, chills, chest pain, shortness of breath, nausea and vomiting, abdominal pain, passing out, weakness or numbness or tingling on one side of your body, or any other concerning signs or symptoms.

## 2022-11-18 NOTE — H&P ADULT - ASSESSMENT
77Y male with PMHx of ischemic strokes (last one in August 2022 residual slurred speech), afib on Eliquis, history of GI bleed, DM BIBEMS for slurred speech. Patient lives at [facility]. ED attending called wife and staff, patient was seen normal around ~6-7pm for dinner time. Patient went to shower ?7PM and was placed in the chair and fell asleep. Around ~830PM, patient was noted to have worsening slurred speech. LKW was unclear. Patient also took his Eliquis dose at 5pm, confirmed with facility. . CTH negative for acute findings. CTA- no flow limiting stenosis or aneurysms. CTP-68 mL perfusion abnormality scattered throughout the bilateral cerebellar, bilateral occipital, bilateral frontal, and left parietalregions. Likely artifactual given lack of vascular territorial distribution and abnormal perfusion curves. Patient's symptoms were rapidly improving, patient c/o of dry mouth. Discussed case with CTC attending, Dr. Martinez, patient is not a tpa candidate as LKW is unclear and patient took his Eliquis dose (abnormal coags).     #Could be related to recrudescence of old stroke    Suggestions:  Infectious workup- CBC, Chest X RAY, UA   Cardiac work up for elevated troponin  Dysphagia screen  Continue home meds  MRI brain contrast  Patient can go to ed obs from neuro standpoint    77Y male with PMHx of ischemic strokes (last one in August 2022 residual slurred speech), afib on Eliquis, history of GI bleed, DM BIBEMS for slurred speech.      In the ED, vitals were /80, HR 88, RR 18, T 97.8, SpO2 99% on RA.  Code stroke was called in the ED.  NIHSS 1 (mild dysarthria). CTH negative for acute findings. CTA- no flow limiting stenosis or aneurysms. CTP-68 mL perfusion abnormality scattered throughout the bilateral cerebellar, bilateral occipital, bilateral frontal, and left parietal regions. Likely artifactual given lack of vascular territorial distribution and abnormal perfusion curves. Patient's symptoms were rapidly improving, patient c/o of dry mouth. patient is not a tpa candidate as LKW is unclear and patient took his Eliquis dose. Labs were significant for Hgb 10.3, Trop 0.03.     Patient is being admitted for stroke workup    #R/O  STROKE  - Admit to Stroke Unit  - CTH: No evidence of acute intracranial hemorrhage or large territorial infarct  - CTA/CTP: unremarkable   -Repeat HCT for acute changes in neuro exam  - neuro checks q4h, - Goal -180  - Neuro on board  - check MRI brain without contrast, TTE with bubble  - PT/OT/SLP eval  - Obtain Hemoglobin A1C, Lipid Profile Panel, and TSH  - Start Aspirin 81 mg  - Continue Plavix 75 mg once daily  - Start Atorvastatin 80 mg once daily    #Could be related to recrudescence of old stroke    #Troponemia  - UNCLEAR ETIOLOGY; UNKNOWN BASELINE  - NO EKG ON MUSE ,    - check EKG, trend troponin    #Normocytic anemia   - Baseline Hgb - unknown   - Trend H/H . Keep active type and screen   - f/u iron profile    #Afib   - c/w eliquis   -     #DM    #DVT PPX :   #Diet:  #GI PPX:   #Activity:  CODE         77Y male with PMHx of ischemic strokes (last one in August 2022 residual slurred speech), afib on Eliquis, history of GI bleed, DM, BIBEMS for slurred speech. At the time of my assessment, patient still have slurred speech.   Patient is being admitted for stroke workup      #STROKE vs TIA   - Admit to Stroke Unit  - CTH: No evidence of acute intracranial hemorrhage or large territorial infarct  - CTA/CTP: unremarkable   - Repeat HCT for acute changes in neuro exam  - neuro checks q4h, Goal -180  - Neuro on board  - c/w eliquis 5mg BID and  Atorvastatin 20 mg once daily  - Start ASA 81 mg daily  - Obtain Hemoglobin A1C, Lipid Profile Panel, and TSH  - check MRI brain without contrast, TTE with bubble  - PT/OT/SLP eval    #Troponemia  - UNCLEAR ETIOLOGY; UNKNOWN BASELINE  - NO EKG ON MUSE , dnies any chest pain     - check EKG, trend troponin    #HTN  #?HX of LE edema vs HFpEF  - c/w losartan 50mg  and lasix 40mg daily   - can add on ccb for bp control   - check TTE; monitor BP    #Normocytic anemia   - Baseline Hgb - unknown   - Trend H/H . Keep active type and screen   - f/u iron profile    #Afib   - c/w eliquis   - c/w lopressor 25mg bid    #DM  - start ISS; monitor FS  - GOAL 140-180     #?HOchronic pancreatis   - c/w creon     Med rec verified with nurse at [place]     #DVT PPX : Eliquis  #Diet: npo pending speech and swallow  #GI PPX: n/a  #Activity: AAT  CODE  fULL

## 2022-11-18 NOTE — PHYSICAL THERAPY INITIAL EVALUATION ADULT - PERTINENT HX OF CURRENT PROBLEM, REHAB EVAL
77Y male with PMHx of ischemic strokes (last one in August 2022 residual slurred speech), afib on Eliquis, history of GI bleed, DM, BIBEMS for slurred speech. At the time of my assessment, patient still have slurred speech.   Patient is being admitted for stroke workup

## 2022-11-18 NOTE — DISCHARGE NOTE PROVIDER - CARE PROVIDERS DIRECT ADDRESSES
,[email or web address] ,[email or web address],DirectAddress_Unknown,DirectAddress_Unknown

## 2022-11-18 NOTE — OCCUPATIONAL THERAPY INITIAL EVALUATION ADULT - PERTINENT HX OF CURRENT PROBLEM, REHAB EVAL
76 y/o man w PMHx DM, ischemic CVA most recently 8/2022 w residual slurred speech, AF on Eliquis, h/o GIB, presented from [facility] on 11/17/2022 for worsening of slurred speech. In hospital had CT Head neg for acute bleed/large ischemia, then had MRI 11/18/2022 which was also unremarkable for any acute findings.

## 2022-11-18 NOTE — DISCHARGE NOTE NURSING/CASE MANAGEMENT/SOCIAL WORK - NSDCPEFALRISK_GEN_ALL_CORE
For information on Fall & Injury Prevention, visit: [email or web address] OR  [email or web address] OR  https://www.cdc.gov/steadi/patient.html

## 2022-11-18 NOTE — DISCHARGE NOTE PROVIDER - NSDCMRMEDTOKEN_GEN_ALL_CORE_FT
aspirin 81 mg oral tablet, chewable: 1 tab(s) orally once a day  atorvastatin 20 mg oral tablet: 1 tab(s) orally once a day  Creon 24,000 units oral delayed release capsule: 1 cap(s) orally 3 times a day  Eliquis 5 mg oral tablet: 1 tab(s) orally 2 times a day  Flonase 50 mcg/inh nasal spray: 2 spray(s) nasal once a day  furosemide 40 mg oral tablet: 1 tab(s) orally once a day  Januvia 100 mg oral tablet: 1 tab(s) orally once a day  latanoprost 0.005% ophthalmic solution: 1 drop(s) to each affected eye once a day (in the evening)  losartan 50 mg oral tablet: 1 tab(s) orally once a day  metFORMIN 500 mg oral tablet: 1 tab(s) orally 2 times a day  Metoprolol Tartrate 25 mg oral tablet: 1 tab(s) orally 2 times a day   aspirin 81 mg oral tablet, chewable: 1 tab(s) orally once a day  atorvastatin 20 mg oral tablet: 1 tab(s) orally once a day  cefpodoxime 100 mg oral tablet: 1 tab(s) orally 2 times a day   Creon 24,000 units oral delayed release capsule: 1 cap(s) orally 3 times a day  Eliquis 5 mg oral tablet: 1 tab(s) orally 2 times a day  Flonase 50 mcg/inh nasal spray: 2 spray(s) nasal once a day  furosemide 40 mg oral tablet: 1 tab(s) orally once a day  Januvia 100 mg oral tablet: 1 tab(s) orally once a day  latanoprost 0.005% ophthalmic solution: 1 drop(s) to each affected eye once a day (in the evening)  losartan 50 mg oral tablet: 1 tab(s) orally once a day  metFORMIN 500 mg oral tablet: 1 tab(s) orally 2 times a day  Metoprolol Tartrate 25 mg oral tablet: 1 tab(s) orally 2 times a day

## 2022-11-18 NOTE — H&P ADULT - NSHPPHYSICALEXAM_GEN_ALL_CORE
:  GENERAL: NAD, speaks in full sentences, no signs of respiratory distress  HEAD:  Atraumatic, Normocephalic  EYES: EOMI, PERRLA, anicteric sclera  NECK: Supple, No JVD  CHEST/LUNG: Clear to auscultation bilaterally; No wheeze; No crackles; No accessory muscles used  HEART: Regular rate and rhythm; No murmurs;   ABDOMEN: Soft, Nontender, Nondistended; Bowel sounds present; No guarding  EXTREMITIES:  2+ Peripheral Pulses, No cyanosis or edema  PSYCH: AAOx3  NEUROLOGY: non-focal  SKIN: No rashes or lesions GENERAL: NAD, speech is slurred, no signs of respiratory distress  HEAD:  Atraumatic, Normocephalic  EYES: EOMI, PERRLA, anicteric sclera  NECK: Supple, No JVD  CHEST/LUNG: Clear to auscultation bilaterally; No wheeze; No crackles; No accessory muscles used  HEART: Regular rate and rhythm; No murmurs;   ABDOMEN: Soft, Nontender, Nondistended; Bowel sounds present; No guarding  EXTREMITIES:  No cyanosis or edema  PSYCH: AAOx3 to time and president   NEUROLOGY: slurred speech, b/l UE and LE strength 5/5   SKIN: No rashes or lesions

## 2022-11-18 NOTE — SWALLOW BEDSIDE ASSESSMENT ADULT - SLP PERTINENT HISTORY OF CURRENT PROBLEM
78 yo male with PMHx of ischemic strokes (last one in August 2022 residual slurred speech), Afib on Eliquis, history of GI bleed, DM. BIBEMS for slurred speech. Code stroke in ED, patient's symptoms were rapidly improving, no tPA. Patient resides at [facility]. CTH, CTA- negative. MRI head negative.

## 2022-11-18 NOTE — OCCUPATIONAL THERAPY INITIAL EVALUATION ADULT - FINE MOTOR COORDINATION EXAM
Lung Anatomy  Your lungs take air in to give your body oxygen, which the body needs to work. Your lungs, like all the tissues in your body, are made up of billions of tiny specialized cells. Old lung cells die and are replaced by new, identical lung cells. This natural process helps ensure healthy lungs.    Date Last Reviewed: 11/1/2016  © 5214-8367 [employer]. [address] [place] [address], [place] [address]. All rights reserved. This information is not intended as a substitute for professional medical care. Always follow your healthcare professional's instructions.        
Left UE/Right UE

## 2022-11-18 NOTE — DISCHARGE NOTE PROVIDER - HOSPITAL COURSE
78 y/o man w PMHx DM, ischemic CVA most recently 8/2022 w residual slurred speech, AF on Eliquis, h/o GIB, presented from [facility] on 11/17/2022 for worsening of slurred speech. In hospital had CT Head neg for acute bleed/large ischemia, then had MRI 11/18/2022 which was also unremarkable for any acute findings.     Admitting History:   77Y male with PMHx of ischemic strokes (last one in August 2022 residual slurred speech), afib on Eliquis, history of GI bleed, DM, BIBEMS for slurred speech. Patient lives at [facility]. Patient stated that all of a sudden he started to notice that his speech was more slurred than usual. He also stated that he was going to the bathroom and noted that he could not stand and was feeling weak. This prompted his presentation to the ED. Patient stated that this occured around 8.   per the chart: ED attending called wife and staff, patient was seen normal around ~6-7pm for dinner time. Patient went to shower ?7PM and was placed in the chair and fell asleep. Around ~830PM, patient was noted to have worsening slurred speech. LKW was unclear. Patient also took his Eliquis dose at 5pm, confirmed with facility.     At the time of my assessment, patient still have slurred speech. He stated that  his speech had improved. deneis any cp, lightheadned ness, numbness/tingling, sob, abdominal pain, palpitations. He has no other complaints at this time      In the ED, vitals were /80, HR 88, RR 18, T 97.8, SpO2 99% on RA.  Code stroke was called in the ED.  NIHSS 1 (mild dysarthria). CTH negative for acute findings. CTA- no flow limiting stenosis or aneurysms. CTP-68 mL perfusion abnormality scattered throughout the bilateral cerebellar, bilateral occipital, bilateral frontal, and left parietal regions. Likely artifactual given lack of vascular territorial distribution and abnormal perfusion curves. . patient is not a tpa candidate as LKW is unclear and patient took his Eliquis dose. Labs were significant for Hgb 10.3, Trop 0.03.     Patient is being admitted for stroke workup    #STROKE vs TIA   - Admit to Stroke Unit  - [facility]: No evidence of acute intracranial hemorrhage or large territorial infarct  - CTA/CTP: unremarkable   - Repeat HCT for acute changes in neuro exam  - neuro checks q4h, Goal -180  - Neuro on board  - c/w eliquis 5mg BID and  Atorvastatin 20 mg once daily  - Start ASA 81 mg daily  - Obtain Hemoglobin A1C, Lipid Profile Panel, and TSH  - check MRI brain without contrast: neg   There is no evidence of acute intracranial pathology. No evidence of acute infarct, intracranial hemorrhage or mass effect.  Extensive chronic microvascular type changes as well as a chronic left cerebellar infarct.  - PT/OT/SLP eval: regular diet     #Troponemia  - 0.03 x2  - No CP, SOB   - EKG: Atrial fibrillation with premature ventricular or aberrantly conducted complexes    #HTN  #?HX of LE edema vs HFpEF  - c/w losartan 50mg  and lasix 40mg daily     #Normocytic anemia   - Baseline Hgb unknown, but no reports of blood loss eg black or bloody stools.   - f/u iron profile - iron borderline low, can f/u as outpatient     #Afib   - c/w eliquis   - c/w lopressor 25mg bid    #DM  - start ISS; monitor FS  - GOAL 140-180     #?HOchronic pancreatis   - c/w creon     Med rec verified with nurse at [place]    76 y/o man w PMHx DM, ischemic CVA most recently 8/2022 w residual slurred speech, AF on Eliquis, h/o GIB, presented from [facility] on 11/17/2022 for worsening of slurred speech. In hospital had CT Head neg for acute bleed/large ischemia, then had MRI 11/18/2022 which was also unremarkable for any acute findings.     Admitting History:   77Y male with PMHx of ischemic strokes (last one in August 2022 residual slurred speech), afib on Eliquis, history of GI bleed, DM, BIBEMS for slurred speech. Patient lives at [facility]. Patient stated that all of a sudden he started to notice that his speech was more slurred than usual. He also stated that he was going to the bathroom and noted that he could not stand and was feeling weak. This prompted his presentation to the ED. Patient stated that this occured around 8.   per the chart: ED attending called wife and staff, patient was seen normal around ~6-7pm for dinner time. Patient went to shower ?7PM and was placed in the chair and fell asleep. Around ~830PM, patient was noted to have worsening slurred speech. LKW was unclear. Patient also took his Eliquis dose at 5pm, confirmed with facility.     At the time of my assessment, patient still have slurred speech. He stated that  his speech had improved. deneis any cp, lightheadned ness, numbness/tingling, sob, abdominal pain, palpitations. He has no other complaints at this time      In the ED, vitals were /80, HR 88, RR 18, T 97.8, SpO2 99% on RA.  Code stroke was called in the ED.  NIHSS 1 (mild dysarthria). CTH negative for acute findings. CTA- no flow limiting stenosis or aneurysms. CTP-68 mL perfusion abnormality scattered throughout the bilateral cerebellar, bilateral occipital, bilateral frontal, and left parietal regions. Likely artifactual given lack of vascular territorial distribution and abnormal perfusion curves. . patient is not a tpa candidate as LKW is unclear and patient took his Eliquis dose. Labs were significant for Hgb 10.3, Trop 0.03.     Patient is being admitted for stroke workup    #STROKE vs TIA   - Admit to Stroke Unit  - [facility]: No evidence of acute intracranial hemorrhage or large territorial infarct  - CTA/CTP: unremarkable   - Repeat HCT for acute changes in neuro exam  - neuro checks q4h, Goal -180  - Neuro on board  - c/w eliquis 5mg BID and  Atorvastatin 20 mg once daily  - Start ASA 81 mg daily  - Obtain Hemoglobin A1C, Lipid Profile Panel, and TSH  - check MRI brain without contrast: neg   There is no evidence of acute intracranial pathology. No evidence of acute infarct, intracranial hemorrhage or mass effect.  Extensive chronic microvascular type changes as well as a chronic left cerebellar infarct.  - PT/OT/SLP eval: regular diet     #Troponemia  - 0.03 x2  - No CP, SOB   - EKG: Atrial fibrillation with premature ventricular or aberrantly conducted complexes    #HTN  #?HX of LE edema vs HFpEF  - c/w losartan 50mg  and lasix 40mg daily     #Normocytic anemia   - Baseline Hgb unknown, but no reports of blood loss eg black or bloody stools.   - f/u iron profile - iron borderline low, can f/u as outpatient     #Afib   - c/w eliquis   - c/w lopressor 25mg bid    #DM  - start ISS; monitor FS  - GOAL 140-180     #?HOchronic pancreatis   - c/w creon     Med rec verified with nurse at [place]     #sharabra: pt seen and examined, slurred speech resolved and MRI neg for any acute events, pt has urinary frequency suspected UTI, will discharge on abx,

## 2022-11-18 NOTE — DISCHARGE NOTE PROVIDER - ATTENDING DISCHARGE PHYSICAL EXAMINATION:
CONSTITUTIONAL: No acute distress, well-developed, well-groomed, AAOx3  HEAD: Atraumatic, normocephalic  EYES: EOM intact, PERRLA, conjunctiva and sclera clear  ENT: Supple, no masses, no thyromegaly, no bruits, no JVD; moist mucous membranes  PULMONARY: Clear to auscultation bilaterally; no wheezes, rales, or rhonchi  CARDIOVASCULAR: Regular rate and rhythm; no murmurs, rubs, or gallops  GASTROINTESTINAL: Soft, non-tender, non-distended; bowel sounds present  MUSCULOSKELETAL: 2+ peripheral pulses; no clubbing, no cyanosis, no edema  NEUROLOGY: ao x3, movs all ext, cn2-12 intact, no aphasia or slurred speech, non-focal  SKIN: No rashes or lesions; warm and dry

## 2022-11-18 NOTE — DISCHARGE NOTE PROVIDER - PROVIDER TOKENS
PROVIDER:[TOKEN:[[id number]]] PROVIDER:[TOKEN:[[id number]]],PROVIDER:[TOKEN:[[id number]]],PROVIDER:[TOKEN:[[id number]]]

## 2022-11-18 NOTE — DISCHARGE NOTE NURSING/CASE MANAGEMENT/SOCIAL WORK - PATIENT PORTAL LINK FT
You can access the FollowMyHealth Patient Portal offered by [facility] by registering at the following website: [email or web address]. By joining [employer]’s FollowMyHealth portal, you will also be able to view your health information using other applications (apps) compatible with our system.

## 2022-11-18 NOTE — PHYSICAL THERAPY INITIAL EVALUATION ADULT - SPECIFY REASON(S)
Hold PT due to patient being unavailable to eval. Pt actively being transported off unit for MRI. Will follow up as appropriate.

## 2022-11-18 NOTE — SWALLOW BEDSIDE ASSESSMENT ADULT - SLP GENERAL OBSERVATIONS
Pt. received awake, alert, ox4, follows commands, clear speech, verbally fluent w/ adequate content, follows commands, room air.

## 2022-11-18 NOTE — OCCUPATIONAL THERAPY INITIAL EVALUATION ADULT - GENERAL OBSERVATIONS, REHAB EVAL
Pt received semi russo in bed in NAD, +dextrose drip, +tele, +O2 via NC, agreeable to OT evaluation, left semi russo in bed in NAD, RN aware

## 2022-11-18 NOTE — SWALLOW BEDSIDE ASSESSMENT ADULT - ADDITIONAL RECOMMENDATIONS
No further ST f/u is warranted at this time. patient's expressive/ receptive language, speech and swallow are WFL

## 2022-11-18 NOTE — DISCHARGE NOTE PROVIDER - CARE PROVIDER_API CALL
Enzo Sanders)  [address]  [address] [place] [address]  Phone: [phone]  Fax: [phone]  Follow Up Time:    Enzo Sanders)  [address]  [address] [place] [address]  Phone: [phone]  Fax: [phone]  Follow Up Time:     Chris Marie)  Neurology  [address] [place] [address]  Phone: [phone]  Fax: [phone]  Follow Up Time:     Flakito Man)  Gastroenterology; Internal Medicine  [address] [place] [address]  Phone: [phone]  Fax: [phone]  Follow Up Time:

## 2022-11-18 NOTE — H&P ADULT - NSHPLABSRESULTS_GEN_ALL_CORE
LABS:                        10.3   9.19  )-----------( 288      ( 17 Nov 2022 23:16 )             31.7     Hb Trend: 10.3<--  WBC Trend: 9.19<--  Plt Trend: 288<--          11-17    146  |  108  |  20  ----------------------------<  138<H>  4.2   |  26  |  1.3    Ca    8.8      17 Nov 2022 23:16    TPro  6.4  /  Alb  3.5  /  TBili  0.3  /  DBili  x   /  AST  14  /  ALT  7   /  AlkPhos  107  11-17    Troponin T, Serum: 0.03 ng/mL *HH* (11-17-22 @ 23:16)    PT/INR - ( 17 Nov 2022 23:16 )   PT: 18.60 sec;   INR: 1.61 ratio         PTT - ( 17 Nov 2022 23:16 )  PTT:34.4 sec    CAPILLARY BLOOD GLUCOSE  155 (17 Nov 2022 23:27)      POCT Blood Glucose.: 155 mg/dL (17 Nov 2022 22:13)          IMAGING:

## 2022-11-18 NOTE — DISCHARGE NOTE PROVIDER - NSDCCPCAREPLAN_GEN_ALL_CORE_FT
PRINCIPAL DISCHARGE DIAGNOSIS  Diagnosis: Dysarthria  Assessment and Plan of Treatment: You were evaluated in the hospital for your worsened slurred speech. You had a stroke workup including CAT scan of your head as well as an MRI which did not show evidence of new stroke(s).   Please follow up with your primary care provider by calling them to make an appointment so that you can see them in 1-2weeks; bring your paperwork from this hospital stay to that visit. You may also address your borderline low iron level (32) at that visit.   In the meantime please take all the medications you were discharged with, unless otherwise instructed by your healthcare provider(s).   Seek immediate medical attention if you develop fevers, chills, chest pain, shortness of breath, nausea and vomiting, abdominal pain, passing out, weakness or numbness or tingling on one side of your body, or any other concerning signs or symptoms.      SECONDARY DISCHARGE DIAGNOSES  Diagnosis: Elevated troponin  Assessment and Plan of Treatment:      PRINCIPAL DISCHARGE DIAGNOSIS  Diagnosis: Dysarthria  Assessment and Plan of Treatment: You were evaluated in the hospital for your worsened slurred speech. You had a stroke workup including CAT scan of your head as well as an MRI which did not show evidence of new stroke(s).   On discharge:   - Continue all home medications  - Take Vantin 100mg twice a day for 7 days for a urinary tract infection  Please follow up with your primary care provider by calling them to make an appointment so that you can see them in 1-2weeks; bring your paperwork from this hospital stay to that visit. You may also address your borderline low iron level (32) at that visit.   In the meantime please take all the medications you were discharged with, unless otherwise instructed by your healthcare provider(s).   Seek immediate medical attention if you develop fevers, chills, chest pain, shortness of breath, nausea and vomiting, abdominal pain, passing out, weakness or numbness or tingling on one side of your body, or any other concerning signs or symptoms.      SECONDARY DISCHARGE DIAGNOSES  Diagnosis: Elevated troponin  Assessment and Plan of Treatment:      PRINCIPAL DISCHARGE DIAGNOSIS  Diagnosis: Dysarthria  Assessment and Plan of Treatment: You were evaluated in the hospital for your worsened slurred speech. You had a stroke workup including CAT scan of your head as well as an MRI which did not show evidence of new stroke(s).   On discharge:   - Continue all home medications  - Take Vantin 100mg twice a day for 7 days for a urinary tract infection  Please follow up with your primary care provider and neurologist by calling them to make an appointment so that you can see them in 1-2weeks; bring your paperwork from this hospital stay to that visit. You may also address your borderline low iron level (32) at that visit.   In the meantime please take all the medications you were discharged with, unless otherwise instructed by your healthcare provider(s).   Seek immediate medical attention if you develop fevers, chills, chest pain, shortness of breath, nausea and vomiting, abdominal pain, passing out, weakness or numbness or tingling on one side of your body, or any other concerning signs or symptoms.      SECONDARY DISCHARGE DIAGNOSES  Diagnosis: Elevated troponin  Assessment and Plan of Treatment:

## 2022-11-18 NOTE — ED ADULT NURSE NOTE - OBJECTIVE STATEMENT
pt sent in from [place] for slurred speech. stroke code activated at time of arrival. pt at baseline in ED,

## 2022-11-18 NOTE — H&P ADULT - HISTORY OF PRESENT ILLNESS
*** Attempted to reach wife   77Y male with PMHx of ischemic strokes (last one in August 2022 residual slurred speech), afib on Eliquis, history of GI bleed, DM BIBEMS for slurred speech. Patient lives at [facility]. ED attending called wife and staff, patient was seen normal around ~6-7pm for dinner time. Patient went to shower ?7PM and was placed in the chair and fell asleep. Around ~830PM, patient was noted to have worsening slurred speech. LKW was unclear. Patient also took his Eliquis dose at 5pm, confirmed with facility.      In the ED, vitals were /80, HR 88, RR 18, T 97.8, SpO2 99% on RA.  Code stroke was called in the ED.  NIHSS 1 (mild dysarthria). CTH negative for acute findings. CTA- no flow limiting stenosis or aneurysms. CTP-68 mL perfusion abnormality scattered throughout the bilateral cerebellar, bilateral occipital, bilateral frontal, and left parietal regions. Likely artifactual given lack of vascular territorial distribution and abnormal perfusion curves. Patient's symptoms were rapidly improving, patient c/o of dry mouth. patient is not a tpa candidate as LKW is unclear and patient took his Eliquis dose. Labs were significant for Hgb 10.3, Trop 0.03.     Patient is being admitted for stroke workup   *** Attempted to reach wife at [phone] but not answer, Hx obtained from chart****     77Y male with PMHx of ischemic strokes (last one in August 2022 residual slurred speech), afib on Eliquis, history of GI bleed, DM BIBEMS for slurred speech. Patient lives at [facility]. ED attending called wife and staff, patient was seen normal around ~6-7pm for dinner time. Patient went to shower ?7PM and was placed in the chair and fell asleep. Around ~830PM, patient was noted to have worsening slurred speech. LKW was unclear. Patient also took his Eliquis dose at 5pm, confirmed with facility.      In the ED, vitals were /80, HR 88, RR 18, T 97.8, SpO2 99% on RA.  Code stroke was called in the ED.  NIHSS 1 (mild dysarthria). CTH negative for acute findings. CTA- no flow limiting stenosis or aneurysms. CTP-68 mL perfusion abnormality scattered throughout the bilateral cerebellar, bilateral occipital, bilateral frontal, and left parietal regions. Likely artifactual given lack of vascular territorial distribution and abnormal perfusion curves. Patient's symptoms were rapidly improving, patient c/o of dry mouth. patient is not a tpa candidate as LKW is unclear and patient took his Eliquis dose. Labs were significant for Hgb 10.3, Trop 0.03.     Patient is being admitted for stroke workup   *** Attempted to reach wife at [phone] but not answer, Hx obtained from chart and patient ****     77Y male with PMHx of ischemic strokes (last one in August 2022 residual slurred speech), afib on Eliquis, history of GI bleed, DM, BIBEMS for slurred speech. Patient lives at [facility]. Patient stated that all of a sudden he started to notice that his speech was more slurred than usual. He also stated that he was going to the bathroom and noted that he could not stand and was feeling weak. This prompted his presentation to the ED. Patient stated that this occured around 8.   per the chart: ED attending called wife and staff, patient was seen normal around ~6-7pm for dinner time. Patient went to shower ?7PM and was placed in the chair and fell asleep. Around ~830PM, patient was noted to have worsening slurred speech. LKW was unclear. Patient also took his Eliquis dose at 5pm, confirmed with facility.     At the time of my assessment, patient still have slurred speech. He stated that  his speech had improved. deneis any cp, lightheadned ness, numbness/tingling, sob, abdominal pain, palpitations. He has no other complaints at this time      In the ED, vitals were /80, HR 88, RR 18, T 97.8, SpO2 99% on RA.  Code stroke was called in the ED.  NIHSS 1 (mild dysarthria). CTH negative for acute findings. CTA- no flow limiting stenosis or aneurysms. CTP-68 mL perfusion abnormality scattered throughout the bilateral cerebellar, bilateral occipital, bilateral frontal, and left parietal regions. Likely artifactual given lack of vascular territorial distribution and abnormal perfusion curves. . patient is not a tpa candidate as LKW is unclear and patient took his Eliquis dose. Labs were significant for Hgb 10.3, Trop 0.03.     Patient is being admitted for stroke workup

## 2022-12-15 DIAGNOSIS — R53.83 OTHER FATIGUE: ICD-10-CM

## 2022-12-15 DIAGNOSIS — R47.81 SLURRED SPEECH: ICD-10-CM

## 2022-12-15 DIAGNOSIS — I48.91 UNSPECIFIED ATRIAL FIBRILLATION: ICD-10-CM

## 2022-12-15 DIAGNOSIS — R47.1 DYSARTHRIA AND ANARTHRIA: ICD-10-CM

## 2022-12-15 DIAGNOSIS — Z79.01 LONG TERM (CURRENT) USE OF ANTICOAGULANTS: ICD-10-CM

## 2022-12-15 DIAGNOSIS — R77.8 OTHER SPECIFIED ABNORMALITIES OF PLASMA PROTEINS: ICD-10-CM

## 2022-12-15 DIAGNOSIS — Z86.73 PERSONAL HISTORY OF TRANSIENT ISCHEMIC ATTACK (TIA), AND CEREBRAL INFARCTION WITHOUT RESIDUAL DEFICITS: ICD-10-CM

## 2022-12-15 DIAGNOSIS — E11.9 TYPE 2 DIABETES MELLITUS WITHOUT COMPLICATIONS: ICD-10-CM

## 2022-12-15 DIAGNOSIS — Z20.822 CONTACT WITH AND (SUSPECTED) EXPOSURE TO COVID-19: ICD-10-CM

## 2023-03-29 ENCOUNTER — APPOINTMENT (OUTPATIENT)
Dept: NEUROLOGY | Facility: CLINIC | Age: 78
End: 2023-03-29

## 2023-07-06 ENCOUNTER — APPOINTMENT (OUTPATIENT)
Dept: ELECTROPHYSIOLOGY | Facility: CLINIC | Age: 78
End: 2023-07-06
Payer: MEDICARE

## 2023-07-06 VITALS
HEART RATE: 91 BPM | BODY MASS INDEX: 33.5 KG/M2 | HEIGHT: 70 IN | WEIGHT: 234 LBS | TEMPERATURE: 98 F | SYSTOLIC BLOOD PRESSURE: 130 MMHG | DIASTOLIC BLOOD PRESSURE: 80 MMHG

## 2023-07-06 DIAGNOSIS — I48.19 OTHER PERSISTENT ATRIAL FIBRILLATION: ICD-10-CM

## 2023-07-06 PROCEDURE — 99205 OFFICE O/P NEW HI 60 MIN: CPT | Mod: 25

## 2023-07-06 PROCEDURE — 93000 ELECTROCARDIOGRAM COMPLETE: CPT

## 2023-07-06 RX ORDER — LOSARTAN POTASSIUM AND HYDROCHLOROTHIAZIDE 25; 100 MG/1; MG/1
100-25 TABLET ORAL
Refills: 0 | Status: COMPLETED | COMMUNITY
End: 2023-07-06

## 2023-07-06 NOTE — PHYSICAL EXAM
[Normal] : normal S1, S2, no murmur, no rub, no gallop [Irregularly Irregular] : irregularly irregular

## 2023-07-06 NOTE — ASSESSMENT
[FreeTextEntry1] : ## Persistent Atrial Fibrillation \par ## Lower GI bleeding\par \par - CHADVASC: 6+ (Age, HTN, DM, CVA)\par - - On Eliquis. Compliant. No bleeding issues. Patient to contact us if there is any bleeding issues, interruption or any issues with OAC. Patient to go to ER/call 911 if any major bleeding. \par - Patient is at increased risk of stroke based on CHADSVASC score. She is a good candidate for Amulet implant as an alternative to anticoagulation as had complications with anticoagulation.\par \par I have discussed different treatment options with the patient including other anticoagulation medication. I have explained the risks and benefits of the procedure to the patient. I have explained to the patient the patient will have FABIÁN in approximately 45 days. Patient will remain on aspirin and Plavix for next 6 months, and then ASA only. There is approximately 1-2% chance of any major cardiovascular complication to occur. Complications include, but are not limited to infection, bleeding, and damage to the vessels, hole in the heart, stroke, death and heart attack. The patient understands the risk and would like to proceed with the procedure. Materials were provided to the patient. Patient indicated that all of his questions were answered to his satisfaction and verbalized understanding.\par \par Referring PCP/Cardiologist   have discussed and recommended this to the patient/family and agrees with implant of watchman.\par \par I recommend to continue anticoagulation for now. \par \par - - Discussed importance of risk factor management.\par - Sleep study/Management of PRABHAKAR\par - Diet/exercise/weight loss\par - Management of GERD if present \par - old records\par - Will discuss possible rhythm control at a later point of time after amulet placement.\par - Return after amulet

## 2023-07-06 NOTE — HISTORY OF PRESENT ILLNESS
[FreeTextEntry1] : Mr. PAUL is a 77 year-year old male with history of non-obstructive CAD, DM, HTN, DL, Ischemic CVA (2018, 2022- affecting speech s/p tpa both times), massive Lower GI bleeding (07/22) s/p colonoscopy, CKD, Persistent Atrial Fibrillation is here for discussion of management of atrial fibrillation.\par \par used to live in [place]. Now in [place]-post rehab. Wife with recent femur fracture post fall- currently in rehab at [facility]. Sister in law is his transport, who lives in [place].\par \par + DONNELLY\par + Af diagnosed in 2000. Was 300+lbs at that time.\par Seen by Dr. Iyer- first time.\par \par Admitted for LGIB, CVA @ [facility].\par \par Denies chest pain, shortness of breath, palpitation, dizziness or LOC except noted above.\par \par EKG (07/06/23): AF 91, QRSd 98\par Cardio: Jaylon \elana Prior cardio: [facility] @ [place] (?Dr. Perez)

## 2023-07-26 NOTE — OCCUPATIONAL THERAPY INITIAL EVALUATION ADULT - LEVEL OF INDEPENDENCE:TOILET, OT EVAL
minimum assist (75% patients effort) V-Y Plasty Text: The defect edges were debeveled with a #15 scalpel blade.  Given the location of the defect, shape of the defect and the proximity to free margins an V-Y advancement flap was deemed most appropriate.  Using a sterile surgical marker, an appropriate advancement flap was drawn incorporating the defect and placing the expected incisions within the relaxed skin tension lines where possible.    The area thus outlined was incised deep to adipose tissue with a #15 scalpel blade.  The skin margins were undermined to an appropriate distance in all directions utilizing iris scissors.

## 2023-09-15 ENCOUNTER — OUTPATIENT (OUTPATIENT)
Dept: OUTPATIENT SERVICES | Facility: HOSPITAL | Age: 78
LOS: 1 days | End: 2023-09-15
Payer: MEDICARE

## 2023-09-15 VITALS
OXYGEN SATURATION: 97 % | HEART RATE: 72 BPM | SYSTOLIC BLOOD PRESSURE: 160 MMHG | HEIGHT: 71 IN | RESPIRATION RATE: 20 BRPM | DIASTOLIC BLOOD PRESSURE: 100 MMHG | TEMPERATURE: 98 F | WEIGHT: 244.93 LBS

## 2023-09-15 DIAGNOSIS — I48.19 OTHER PERSISTENT ATRIAL FIBRILLATION: ICD-10-CM

## 2023-09-15 DIAGNOSIS — Z98.890 OTHER SPECIFIED POSTPROCEDURAL STATES: Chronic | ICD-10-CM

## 2023-09-15 DIAGNOSIS — Z01.818 ENCOUNTER FOR OTHER PREPROCEDURAL EXAMINATION: ICD-10-CM

## 2023-09-15 DIAGNOSIS — Z90.89 ACQUIRED ABSENCE OF OTHER ORGANS: Chronic | ICD-10-CM

## 2023-09-15 LAB
ALBUMIN SERPL ELPH-MCNC: 4.3 G/DL — SIGNIFICANT CHANGE UP (ref 3.5–5.2)
ALP SERPL-CCNC: 103 U/L — SIGNIFICANT CHANGE UP (ref 30–115)
ALT FLD-CCNC: 12 U/L — SIGNIFICANT CHANGE UP (ref 0–41)
ANION GAP SERPL CALC-SCNC: 13 MMOL/L — SIGNIFICANT CHANGE UP (ref 7–14)
APTT BLD: 35.1 SEC — SIGNIFICANT CHANGE UP (ref 27–39.2)
AST SERPL-CCNC: 20 U/L — SIGNIFICANT CHANGE UP (ref 0–41)
BASOPHILS # BLD AUTO: 0.09 K/UL — SIGNIFICANT CHANGE UP (ref 0–0.2)
BASOPHILS NFR BLD AUTO: 0.9 % — SIGNIFICANT CHANGE UP (ref 0–1)
BILIRUB SERPL-MCNC: 0.8 MG/DL — SIGNIFICANT CHANGE UP (ref 0.2–1.2)
BLD GP AB SCN SERPL QL: SIGNIFICANT CHANGE UP
BUN SERPL-MCNC: 25 MG/DL — HIGH (ref 10–20)
CALCIUM SERPL-MCNC: 9 MG/DL — SIGNIFICANT CHANGE UP (ref 8.4–10.5)
CHLORIDE SERPL-SCNC: 101 MMOL/L — SIGNIFICANT CHANGE UP (ref 98–110)
CO2 SERPL-SCNC: 26 MMOL/L — SIGNIFICANT CHANGE UP (ref 17–32)
CREAT SERPL-MCNC: 1.4 MG/DL — SIGNIFICANT CHANGE UP (ref 0.7–1.5)
EGFR: 51 ML/MIN/1.73M2 — LOW
EOSINOPHIL # BLD AUTO: 0.28 K/UL — SIGNIFICANT CHANGE UP (ref 0–0.7)
EOSINOPHIL NFR BLD AUTO: 2.8 % — SIGNIFICANT CHANGE UP (ref 0–8)
GLUCOSE SERPL-MCNC: 115 MG/DL — HIGH (ref 70–99)
HCT VFR BLD CALC: 36.1 % — LOW (ref 42–52)
HGB BLD-MCNC: 11.2 G/DL — LOW (ref 14–18)
IMM GRANULOCYTES NFR BLD AUTO: 0.7 % — HIGH (ref 0.1–0.3)
INR BLD: 1.93 RATIO — HIGH (ref 0.65–1.3)
LYMPHOCYTES # BLD AUTO: 2.58 K/UL — SIGNIFICANT CHANGE UP (ref 1.2–3.4)
LYMPHOCYTES # BLD AUTO: 25.8 % — SIGNIFICANT CHANGE UP (ref 20.5–51.1)
MCHC RBC-ENTMCNC: 28.4 PG — SIGNIFICANT CHANGE UP (ref 27–31)
MCHC RBC-ENTMCNC: 31 G/DL — LOW (ref 32–37)
MCV RBC AUTO: 91.4 FL — SIGNIFICANT CHANGE UP (ref 80–94)
MONOCYTES # BLD AUTO: 0.71 K/UL — HIGH (ref 0.1–0.6)
MONOCYTES NFR BLD AUTO: 7.1 % — SIGNIFICANT CHANGE UP (ref 1.7–9.3)
MRSA PCR RESULT.: NEGATIVE — SIGNIFICANT CHANGE UP
NEUTROPHILS # BLD AUTO: 6.26 K/UL — SIGNIFICANT CHANGE UP (ref 1.4–6.5)
NEUTROPHILS NFR BLD AUTO: 62.7 % — SIGNIFICANT CHANGE UP (ref 42.2–75.2)
NRBC # BLD: 0 /100 WBCS — SIGNIFICANT CHANGE UP (ref 0–0)
PLATELET # BLD AUTO: 266 K/UL — SIGNIFICANT CHANGE UP (ref 130–400)
PMV BLD: 9.6 FL — SIGNIFICANT CHANGE UP (ref 7.4–10.4)
POTASSIUM SERPL-MCNC: 4.1 MMOL/L — SIGNIFICANT CHANGE UP (ref 3.5–5)
POTASSIUM SERPL-SCNC: 4.1 MMOL/L — SIGNIFICANT CHANGE UP (ref 3.5–5)
PROT SERPL-MCNC: 7.1 G/DL — SIGNIFICANT CHANGE UP (ref 6–8)
PROTHROM AB SERPL-ACNC: 22.4 SEC — HIGH (ref 9.95–12.87)
RBC # BLD: 3.95 M/UL — LOW (ref 4.7–6.1)
RBC # FLD: 15 % — HIGH (ref 11.5–14.5)
SODIUM SERPL-SCNC: 140 MMOL/L — SIGNIFICANT CHANGE UP (ref 135–146)
WBC # BLD: 9.99 K/UL — SIGNIFICANT CHANGE UP (ref 4.8–10.8)
WBC # FLD AUTO: 9.99 K/UL — SIGNIFICANT CHANGE UP (ref 4.8–10.8)

## 2023-09-15 PROCEDURE — 85610 PROTHROMBIN TIME: CPT

## 2023-09-15 PROCEDURE — 86901 BLOOD TYPING SEROLOGIC RH(D): CPT

## 2023-09-15 PROCEDURE — 86900 BLOOD TYPING SEROLOGIC ABO: CPT

## 2023-09-15 PROCEDURE — 87186 SC STD MICRODIL/AGAR DIL: CPT

## 2023-09-15 PROCEDURE — 93010 ELECTROCARDIOGRAM REPORT: CPT

## 2023-09-15 PROCEDURE — 80053 COMPREHEN METABOLIC PANEL: CPT

## 2023-09-15 PROCEDURE — 86850 RBC ANTIBODY SCREEN: CPT

## 2023-09-15 PROCEDURE — 87640 STAPH A DNA AMP PROBE: CPT

## 2023-09-15 PROCEDURE — 87077 CULTURE AEROBIC IDENTIFY: CPT

## 2023-09-15 PROCEDURE — 36415 COLL VENOUS BLD VENIPUNCTURE: CPT

## 2023-09-15 PROCEDURE — 99214 OFFICE O/P EST MOD 30 MIN: CPT | Mod: 25

## 2023-09-15 PROCEDURE — 85025 COMPLETE CBC W/AUTO DIFF WBC: CPT

## 2023-09-15 PROCEDURE — 87086 URINE CULTURE/COLONY COUNT: CPT

## 2023-09-15 PROCEDURE — 93005 ELECTROCARDIOGRAM TRACING: CPT

## 2023-09-15 PROCEDURE — 85730 THROMBOPLASTIN TIME PARTIAL: CPT

## 2023-09-15 PROCEDURE — 87641 MR-STAPH DNA AMP PROBE: CPT

## 2023-09-15 RX ORDER — METOPROLOL TARTRATE 50 MG
1 TABLET ORAL
Qty: 0 | Refills: 0 | DISCHARGE

## 2023-09-15 RX ORDER — THIAMINE MONONITRATE (VIT B1) 100 MG
1 TABLET ORAL
Qty: 0 | Refills: 0 | DISCHARGE

## 2023-09-15 RX ORDER — LOSARTAN POTASSIUM 100 MG/1
1 TABLET, FILM COATED ORAL
Qty: 0 | Refills: 0 | DISCHARGE

## 2023-09-15 RX ORDER — FOLIC ACID 0.8 MG
1 TABLET ORAL
Qty: 0 | Refills: 0 | DISCHARGE

## 2023-09-15 RX ORDER — METFORMIN HYDROCHLORIDE 850 MG/1
1 TABLET ORAL
Refills: 0 | DISCHARGE

## 2023-09-15 RX ORDER — DILTIAZEM HCL 120 MG
1 CAPSULE, EXT RELEASE 24 HR ORAL
Qty: 0 | Refills: 0 | DISCHARGE

## 2023-09-15 RX ORDER — ATORVASTATIN CALCIUM 80 MG/1
1 TABLET, FILM COATED ORAL
Qty: 0 | Refills: 0 | DISCHARGE

## 2023-09-15 RX ORDER — APIXABAN 2.5 MG/1
1 TABLET, FILM COATED ORAL
Qty: 0 | Refills: 0 | DISCHARGE

## 2023-09-15 RX ORDER — PREGABALIN 225 MG/1
1 CAPSULE ORAL
Qty: 0 | Refills: 0 | DISCHARGE

## 2023-09-15 RX ORDER — METFORMIN HYDROCHLORIDE 850 MG/1
1 TABLET ORAL
Qty: 0 | Refills: 0 | DISCHARGE

## 2023-09-15 RX ORDER — COLESTIPOL HCL 5 G
1 GRANULES (GRAM) ORAL
Qty: 0 | Refills: 0 | DISCHARGE

## 2023-09-15 RX ORDER — SITAGLIPTIN 50 MG/1
1 TABLET, FILM COATED ORAL
Qty: 0 | Refills: 0 | DISCHARGE

## 2023-09-15 NOTE — H&P PST ADULT - HISTORY OF PRESENT ILLNESS
PATIENT REPORTS AFIB X 23 YEARS    HERE FOR PAST FOR PAST FOR "AMULET"   HAD SEVERE RECTAL BLEEDING X 2  IN 2022    PT WAS ADVISED TO HAVE THIS PROCEDURE    PATIENT CURRENTLY DENIES CHEST PAIN    PALPITATIONS,  DYSURIA, OR URI WITHIN THE IN PAST 2 WEEKS    -Denies travel outside the [place] in the past 30 days  -Patient denies any signs or symptoms of COVID 19 and denies contact with known positive individuals.    -Patient was instructed to quarantine pre-procedure, practice exposure control measures, continue to self-monitor and report any concerns to their proceduralist.  -Pt advised self quarantine till day of procedure    ANESTHESIA ALERT  NO--Difficult Airway  NO--History of neck surgery or radiation  NO--Limited ROM of neck  NO--History of Malignant hyperthermia  NO--No personal or family history of Pseudocholinesterase deficiency.  NO--Prior Anesthesia Complication  NO--Latex Allergy  NO--Loose teeth  NO--History of Rheumatoid Arthritis  NO--Bleeding risk  SEE ABOVE DUE TO ELIQUIS    CVA ON COUMADIN  NO--PRABHAKAR  NO--Implants  NO--Other_____    -PT DENIES ANY RASHES, ABRASION, OR OPEN WOUNDS     -Pt denies any suicidal ideation or thoughts.  Pt states not a threat to self or others.  DENIES DOMESTIC VIOLENCE      AS PER THE PT, THIS IS HIS/HER COMPLETE MEDICAL AND SURGICAL HX, INCLUDING MEDICATIONS PRESCRIBED AND OVER THE COUNTER    Patient verbalized understanding of instructions and was given the opportunity to ask questions and have them answered.    Duke Activity Status Index (DASI) from [employer]  on 9/15/2023  ** All calculations should be rechecked by clinician prior to use **    RESULT SUMMARY:  17.95 points  The higher the score (maximum 58.2), the higher the functional status.    4.95 METs        INPUTS:  Take care of self —> 2.75 = Yes  Walk indoors —> 1.75 = Yes  Walk 1&ndash;2 blocks on level ground —> 2.75 = Yes  Climb a flight of stairs or walk up a hill —> 0 = No  Run a short distance —> 0 = No  Do light work around the house —> 2.7 = Yes  Do moderate work around the house —> 0 = No  Do heavy work around the house —> 8 = Yes  Do yardwork —> 0 = No  Have sexual relations —> 0 = No  Participate in moderate recreational activities —> 0 = No  Participate in strenuous sports —> 0 = No        Revised Cardiac Risk Index for Pre-Operative Risk from [employer]  on 9/15/2023  ** All calculations should be rechecked by clinician prior to use **    RESULT SUMMARY:  2 points  Class III Risk    10.1 %  30-day risk of death, MI, or cardiac arrest    From [employer] 2017, based on pooled data from 5 high quality external validations (4 prospective). These numbers are higher than those often quoted from the now-outdated original study (Etienne 1999). See Evidence for details.      INPUTS:  Elevated-risk surgery —> 1 = Yes  History of ischemic heart disease —> 0 = No  History of congestive heart failure —> 0 = No  History of cerebrovascular disease —> 1 = Yes  Pre-operative treatment with insulin —> 0 = No  Pre-operative creatinine >2 mg/dL / 176.8 µmol/L —> 0 = No  
79 Y/O MALE PT TO PAST WITH HX  A-FIB ( PT S/P CVA 2018)  PT NOW FOR SCHEDULED PROCEDURE ( LEFT ATRIAL APPENDAGE CLOSURE) . PT DENIES ANY CP SOB PALP COUGH DYSURIA FEVER URI. PT ABLE TO HAYLEE 1-2 FOS W/O SOB  Anesthesia Alert  NO--Difficult Airway  NO--History of neck surgery or radiation  NO--Limited ROM of neck  NO--History of Malignant hyperthermia  NO--Personal or family history of Pseudocholinesterase deficiency.  NO--Prior Anesthesia Complication  NO--Latex Allergy  NO--Loose teeth  NO--History of Rheumatoid Arthritis  NO--PRABHAKAR  NO--Bleeding risk  NO--Other_____

## 2023-09-15 NOTE — H&P PST ADULT - NSICDXPASTMEDICALHX_GEN_ALL_CORE_FT
PAST MEDICAL HISTORY:  Afib     CVA (cerebral vascular accident)     Diabetes     Gout     HLD (hyperlipidemia)     HTN (hypertension)     Obesity     
PAST MEDICAL HISTORY:  Afib     CVA (cerebral vascular accident)     Diabetes     Gout     History of rectal bleeding     HLD (hyperlipidemia)     HTN (hypertension)     Obesity

## 2023-09-15 NOTE — H&P PST ADULT - NSANTHOSAYNRD_GEN_A_CORE
No. PRABHAKAR screening performed.  STOP BANG Legend: 0-2 = LOW Risk; 3-4 = INTERMEDIATE Risk; 5-8 = HIGH Risk

## 2023-09-15 NOTE — H&P PST ADULT - NSICDXPASTSURGICALHX_GEN_ALL_CORE_FT
PAST SURGICAL HISTORY:  No significant past surgical history     
PAST SURGICAL HISTORY:  H/O colonoscopy     History of tonsillectomy     S/P cardiac cath

## 2023-09-15 NOTE — H&P PST ADULT - REASON FOR ADMISSION
Suite: EP LabProceduralist: Rj Thomson  Confirmed Surgery DateTime: 09- - 7:30PAST DateTime: 09- - 18:00Procedure: KENNETH/ FABIÁN  ERP?: UnavailableLaterality: N/ALength of Procedure: 180 Minutes  Anesthesia Type: General

## 2023-09-16 DIAGNOSIS — Z01.818 ENCOUNTER FOR OTHER PREPROCEDURAL EXAMINATION: ICD-10-CM

## 2023-09-16 DIAGNOSIS — I48.19 OTHER PERSISTENT ATRIAL FIBRILLATION: ICD-10-CM

## 2023-09-20 LAB
-  AMIKACIN: SIGNIFICANT CHANGE UP
-  AMOXICILLIN/CLAVULANIC ACID: SIGNIFICANT CHANGE UP
-  AMPICILLIN/SULBACTAM: SIGNIFICANT CHANGE UP
-  AMPICILLIN: SIGNIFICANT CHANGE UP
-  AZTREONAM: SIGNIFICANT CHANGE UP
-  CEFAZOLIN: SIGNIFICANT CHANGE UP
-  CEFEPIME: SIGNIFICANT CHANGE UP
-  CEFOXITIN: SIGNIFICANT CHANGE UP
-  CEFTRIAXONE: SIGNIFICANT CHANGE UP
-  CEFUROXIME: SIGNIFICANT CHANGE UP
-  CIPROFLOXACIN: SIGNIFICANT CHANGE UP
-  ERTAPENEM: SIGNIFICANT CHANGE UP
-  GENTAMICIN: SIGNIFICANT CHANGE UP
-  LEVOFLOXACIN: SIGNIFICANT CHANGE UP
-  MEROPENEM: SIGNIFICANT CHANGE UP
-  NITROFURANTOIN: SIGNIFICANT CHANGE UP
-  PIPERACILLIN/TAZOBACTAM: SIGNIFICANT CHANGE UP
-  TOBRAMYCIN: SIGNIFICANT CHANGE UP
-  TRIMETHOPRIM/SULFAMETHOXAZOLE: SIGNIFICANT CHANGE UP
CULTURE RESULTS: SIGNIFICANT CHANGE UP
METHOD TYPE: SIGNIFICANT CHANGE UP
ORGANISM # SPEC MICROSCOPIC CNT: SIGNIFICANT CHANGE UP
ORGANISM # SPEC MICROSCOPIC CNT: SIGNIFICANT CHANGE UP
SPECIMEN SOURCE: SIGNIFICANT CHANGE UP

## 2023-09-26 ENCOUNTER — TRANSCRIPTION ENCOUNTER (OUTPATIENT)
Age: 78
End: 2023-09-26

## 2023-09-26 ENCOUNTER — INPATIENT (INPATIENT)
Facility: HOSPITAL | Age: 78
LOS: 0 days | Discharge: HOME | DRG: 274 | End: 2023-09-27
Attending: STUDENT IN AN ORGANIZED HEALTH CARE EDUCATION/TRAINING PROGRAM | Admitting: STUDENT IN AN ORGANIZED HEALTH CARE EDUCATION/TRAINING PROGRAM
Payer: MEDICARE

## 2023-09-26 ENCOUNTER — APPOINTMENT (OUTPATIENT)
Dept: ELECTROPHYSIOLOGY | Facility: HOSPITAL | Age: 78
End: 2023-09-26

## 2023-09-26 VITALS — WEIGHT: 244.71 LBS

## 2023-09-26 DIAGNOSIS — I48.19 OTHER PERSISTENT ATRIAL FIBRILLATION: ICD-10-CM

## 2023-09-26 DIAGNOSIS — Z98.890 OTHER SPECIFIED POSTPROCEDURAL STATES: Chronic | ICD-10-CM

## 2023-09-26 DIAGNOSIS — Z90.89 ACQUIRED ABSENCE OF OTHER ORGANS: Chronic | ICD-10-CM

## 2023-09-26 LAB
FLUAV AG NPH QL: SIGNIFICANT CHANGE UP
FLUBV AG NPH QL: SIGNIFICANT CHANGE UP
GLUCOSE BLDC GLUCOMTR-MCNC: 151 MG/DL — HIGH (ref 70–99)
RSV RNA NPH QL NAA+NON-PROBE: SIGNIFICANT CHANGE UP
SARS-COV-2 RNA SPEC QL NAA+PROBE: SIGNIFICANT CHANGE UP

## 2023-09-26 PROCEDURE — 76937 US GUIDE VASCULAR ACCESS: CPT | Mod: 26

## 2023-09-26 PROCEDURE — 93662 INTRACARDIAC ECG (ICE): CPT | Mod: 26

## 2023-09-26 PROCEDURE — 82962 GLUCOSE BLOOD TEST: CPT

## 2023-09-26 PROCEDURE — 93306 TTE W/DOPPLER COMPLETE: CPT | Mod: 26

## 2023-09-26 PROCEDURE — 93005 ELECTROCARDIOGRAM TRACING: CPT

## 2023-09-26 PROCEDURE — 93662 INTRACARDIAC ECG (ICE): CPT

## 2023-09-26 PROCEDURE — 0241U: CPT

## 2023-09-26 PROCEDURE — 33340 PERQ CLSR TCAT L ATR APNDGE: CPT | Mod: Q0

## 2023-09-26 PROCEDURE — 94640 AIRWAY INHALATION TREATMENT: CPT

## 2023-09-26 RX ORDER — LIPASE/PROTEASE/AMYLASE 16-48-48K
1 CAPSULE,DELAYED RELEASE (ENTERIC COATED) ORAL
Refills: 0 | Status: DISCONTINUED | OUTPATIENT
Start: 2023-09-26 | End: 2023-09-26

## 2023-09-26 RX ORDER — FUROSEMIDE 40 MG
40 TABLET ORAL ONCE
Refills: 0 | Status: COMPLETED | OUTPATIENT
Start: 2023-09-26 | End: 2023-09-26

## 2023-09-26 RX ORDER — FUROSEMIDE 40 MG
40 TABLET ORAL DAILY
Refills: 0 | Status: DISCONTINUED | OUTPATIENT
Start: 2023-09-26 | End: 2023-09-27

## 2023-09-26 RX ORDER — LANOLIN ALCOHOL/MO/W.PET/CERES
5 CREAM (GRAM) TOPICAL AT BEDTIME
Refills: 0 | Status: DISCONTINUED | OUTPATIENT
Start: 2023-09-26 | End: 2023-09-27

## 2023-09-26 RX ORDER — LOSARTAN POTASSIUM 100 MG/1
50 TABLET, FILM COATED ORAL DAILY
Refills: 0 | Status: DISCONTINUED | OUTPATIENT
Start: 2023-09-26 | End: 2023-09-27

## 2023-09-26 RX ORDER — BENZOCAINE 10 %
1 GEL (GRAM) MUCOUS MEMBRANE ONCE
Refills: 0 | Status: COMPLETED | OUTPATIENT
Start: 2023-09-26 | End: 2023-09-26

## 2023-09-26 RX ORDER — ACETAMINOPHEN 500 MG
650 TABLET ORAL EVERY 6 HOURS
Refills: 0 | Status: DISCONTINUED | OUTPATIENT
Start: 2023-09-26 | End: 2023-09-27

## 2023-09-26 RX ORDER — LIPASE/PROTEASE/AMYLASE 16-48-48K
2 CAPSULE,DELAYED RELEASE (ENTERIC COATED) ORAL
Refills: 0 | Status: DISCONTINUED | OUTPATIENT
Start: 2023-09-26 | End: 2023-09-27

## 2023-09-26 RX ORDER — CEFAZOLIN SODIUM 1 G
1000 VIAL (EA) INJECTION EVERY 8 HOURS
Refills: 0 | Status: DISCONTINUED | OUTPATIENT
Start: 2023-09-26 | End: 2023-09-26

## 2023-09-26 RX ORDER — HYDROMORPHONE HYDROCHLORIDE 2 MG/ML
0.5 INJECTION INTRAMUSCULAR; INTRAVENOUS; SUBCUTANEOUS
Refills: 0 | Status: DISCONTINUED | OUTPATIENT
Start: 2023-09-26 | End: 2023-09-27

## 2023-09-26 RX ORDER — LATANOPROST 0.05 MG/ML
1 SOLUTION/ DROPS OPHTHALMIC; TOPICAL AT BEDTIME
Refills: 0 | Status: DISCONTINUED | OUTPATIENT
Start: 2023-09-26 | End: 2023-09-27

## 2023-09-26 RX ORDER — CEFAZOLIN SODIUM 1 G
2000 VIAL (EA) INJECTION ONCE
Refills: 0 | Status: DISCONTINUED | OUTPATIENT
Start: 2023-09-26 | End: 2023-09-27

## 2023-09-26 RX ORDER — APIXABAN 2.5 MG/1
1 TABLET, FILM COATED ORAL
Qty: 0 | Refills: 0 | DISCHARGE

## 2023-09-26 RX ORDER — CLOPIDOGREL BISULFATE 75 MG/1
75 TABLET, FILM COATED ORAL DAILY
Refills: 0 | Status: DISCONTINUED | OUTPATIENT
Start: 2023-09-26 | End: 2023-09-27

## 2023-09-26 RX ORDER — FLUTICASONE PROPIONATE 50 MCG
2 SPRAY, SUSPENSION NASAL
Refills: 0 | Status: DISCONTINUED | OUTPATIENT
Start: 2023-09-26 | End: 2023-09-27

## 2023-09-26 RX ORDER — METOPROLOL TARTRATE 50 MG
25 TABLET ORAL
Refills: 0 | Status: DISCONTINUED | OUTPATIENT
Start: 2023-09-26 | End: 2023-09-27

## 2023-09-26 RX ORDER — ASPIRIN/CALCIUM CARB/MAGNESIUM 324 MG
81 TABLET ORAL DAILY
Refills: 0 | Status: DISCONTINUED | OUTPATIENT
Start: 2023-09-26 | End: 2023-09-27

## 2023-09-26 RX ADMIN — Medication 1 SPRAY(S): at 18:26

## 2023-09-26 RX ADMIN — Medication 2 CAPSULE(S): at 18:26

## 2023-09-26 RX ADMIN — CLOPIDOGREL BISULFATE 75 MILLIGRAM(S): 75 TABLET, FILM COATED ORAL at 13:17

## 2023-09-26 RX ADMIN — Medication 81 MILLIGRAM(S): at 13:17

## 2023-09-26 RX ADMIN — Medication 25 MILLIGRAM(S): at 18:29

## 2023-09-26 RX ADMIN — Medication 2 SPRAY(S): at 18:26

## 2023-09-26 RX ADMIN — Medication 40 MILLIGRAM(S): at 18:29

## 2023-09-26 NOTE — DISCHARGE NOTE PROVIDER - NSDCFUSCHEDAPPT_GEN_ALL_CORE_FT
[facility]  ELECTROPH [address] [facility]  Scheduled Appointment: 10/26/2023

## 2023-09-26 NOTE — DISCHARGE NOTE PROVIDER - NSDCMRMEDTOKEN_GEN_ALL_CORE_FT
Creon 24,000 units oral delayed release capsule: 1 cap(s) orally 3 times a day  Flonase 50 mcg/inh nasal spray: 2 spray(s) nasal once a day  furosemide 40 mg oral tablet: 1 tab(s) orally once a day  Januvia 100 mg oral tablet: 1 tab(s) orally once a day  latanoprost 0.005% ophthalmic solution: 1 drop(s) to each affected eye once a day (in the evening)  losartan 50 mg oral tablet: 1 tab(s) orally once a day  lovastatin 20 mg oral tablet: 1 tab(s) orally once a day  metFORMIN 500 mg oral tablet: 1 tab(s) orally 2 times a day  Metoprolol Tartrate 25 mg oral tablet: 1 tab(s) orally 2 times a day   aspirin 81 mg oral tablet, chewable: 1 tab(s) orally once a day  clopidogrel 75 mg oral tablet: 1 tab(s) orally once a day  Creon 24,000 units oral delayed release capsule: 1 cap(s) orally 3 times a day  Flonase 50 mcg/inh nasal spray: 2 spray(s) nasal once a day  furosemide 40 mg oral tablet: 1 tab(s) orally once a day  Januvia 100 mg oral tablet: 1 tab(s) orally once a day  latanoprost 0.005% ophthalmic solution: 1 drop(s) to each affected eye once a day (in the evening)  losartan 50 mg oral tablet: 1 tab(s) orally once a day  lovastatin 20 mg oral tablet: 1 tab(s) orally once a day  metFORMIN 500 mg oral tablet: 1 tab(s) orally 2 times a day please resume on 9/29/2023inAM  Metoprolol Tartrate 25 mg oral tablet: 1 tab(s) orally 2 times a day

## 2023-09-26 NOTE — DISCHARGE NOTE PROVIDER - CARE PROVIDER_API CALL
Rj Thomson  Cardiac Electrophysiology  [address] [place] [id number]  Phone: [phone]  Fax: [phone]  Follow Up Time: 1 month

## 2023-09-26 NOTE — DISCHARGE NOTE PROVIDER - ATTENDING DISCHARGE PHYSICAL EXAMINATION:
Bilateral femoral venous access sites are clean, dry, and intact with no hematoma or tenderness. detailed exam

## 2023-09-26 NOTE — DISCHARGE NOTE PROVIDER - HOSPITAL COURSE
Patient is a 78y Male  with PMHx of non-obstructive CAD,DM, HTN, DL, Ischemic CVA (2018, 2022- affecting speech s/p tpa both times), massive lower GI bleeding  (7/22) s/p colonoscopy, CKD, Perisistent Atrial Fibrillation who presented to [facility] for elective implantation of Left Atrial Appendage occlusive device. On 9/26 patient underwent successful Amulet  implantation. Patient was monitored overnight. On POD 1 patient remains HD stable with no complaints. Examination of B/L common femoral venous access sites reveal a Clear and dry area with no hematoma or erythema. Patient will start Aspirin 81mg daily and Plavix 75mg daily  for thromboembolic prophylaxis for 45 days with plan to for FABIÁN To evaluate appendage velocities as an outpatient. Patient is being DC home in stable condition.

## 2023-09-26 NOTE — PATIENT PROFILE ADULT - FALL HARM RISK - UNIVERSAL INTERVENTIONS
Bed in lowest position, wheels locked, appropriate side rails in place/Call bell, personal items and telephone in reach/Instruct patient to call for assistance before getting out of bed or chair/Non-slip footwear when patient is out of bed/[place] to call system/Physically safe environment - no spills, clutter or unnecessary equipment/Purposeful Proactive Rounding/Room/bathroom lighting operational, light cord in reach

## 2023-09-26 NOTE — ASU PATIENT PROFILE, ADULT - FALL HARM RISK - UNIVERSAL INTERVENTIONS
Bed in lowest position, wheels locked, appropriate side rails in place/Call bell, personal items and telephone in reach/Instruct patient to call for assistance before getting out of bed or chair/Non-slip footwear when patient is out of bed/[place] to call system/Physically safe environment - no spills, clutter or unnecessary equipment/Purposeful Proactive Rounding/Room/bathroom lighting operational, light cord in reach

## 2023-09-26 NOTE — DISCHARGE NOTE PROVIDER - NSDCCPTREATMENT_GEN_ALL_CORE_FT
PRINCIPAL PROCEDURE  Procedure: Insertion, Watchman left atrial appendage closure device  Findings and Treatment: You underwent Amulet insertion with Dr. Thomson on 9/26 without complications.    Please take Keflex 500 mg (Bactrim / doxycycline 100 mg) twice daily for 5 days.   - Please start taking aspirin 81 mg daily.   - Please start taking Plavix 75mg daily.  - You may shower today.  - Do not drive or operate heavy machinery for 3 days.  - Do not submerge in water (example: baths, swimming) for 1 week.  - No squatting, exertional activities, or lifting anything > 10 lbs for 1 week.  - Any sudden swelling, redness, fever, discharge, or severe pain, call the Electrophysiology Office at [phone].  - Please followup with Dr. Thomson in 1 month.     PRINCIPAL PROCEDURE  Procedure: Insertion, Watchman left atrial appendage closure device  Findings and Treatment: You underwent Amulet insertion with Dr. Thomson on 9/26 without complications.   - Please start taking aspirin 81 mg daily.   - Please start taking Plavix 75mg daily.  - You may shower today.  - Do not drive or operate heavy machinery for 3 days.  - Do not submerge in water (example: baths, swimming) for 1 week.  - No squatting, exertional activities, or lifting anything > 10 lbs for 1 week.  - Any sudden swelling, redness, fever, discharge, or severe pain, call the Electrophysiology Office at [phone].  - Please followup with Dr. Thomson in 1 month.

## 2023-09-26 NOTE — ASU PATIENT PROFILE, ADULT - NSICDXPASTMEDICALHX_GEN_ALL_CORE_FT
PAST MEDICAL HISTORY:  Afib     CVA (cerebral vascular accident)     Diabetes     Gout     History of rectal bleeding     HLD (hyperlipidemia)     HTN (hypertension)     Obesity

## 2023-09-26 NOTE — PATIENT PROFILE ADULT - FUNCTIONAL ASSESSMENT - BASIC MOBILITY 3.
Department of Anesthesiology  Postprocedure Note    Patient: Abena Bailey  MRN: [id number]  YOB: 1988  Date of evaluation: 11/12/2021  Time:  5:09 PM     Procedure Summary     Date: 11/12/21 Room / Location: [address]    Anesthesia Start: 1129 Anesthesia Stop: 1625    Procedures:       EXPLORATORY LAPAROTOMY, JEJUNOSTOMY FEEDING TUBE, DECOMPRESSIVE GASTRIC TUBE (N/A Abdomen)      VIDEO ASSISTED THORACOSCOPY DECORTICATION (Left ) Diagnosis: (ESOPHAGEAL PERFORATION)    Surgeons: Sunni Villafana MD; Debby Abdalla MD Responsible Provider: Roz Her MD    Anesthesia Type: general ASA Status: 4          Anesthesia Type: general    Ronaldo Phase I:      Ronaldo Phase II:      Last vitals: Reviewed and per EMR flowsheets. Anesthesia Post Evaluation    Patient location during evaluation: ICU  Patient participation: complete - patient participated  Level of consciousness: awake  Pain score: 0  Airway patency: patent  Nausea & Vomiting: no nausea and no vomiting  Complications: no  Cardiovascular status: blood pressure returned to baseline  Respiratory status: spontaneous ventilation and CPAP  Hydration status: euvolemic  Comments: Extubated in OR. Had Pox 88 in PACU and his CT were not connected to suction.   NIV placed and suction applied to CT  Transferred back to TICU from PACU
3 = A little assistance

## 2023-09-27 ENCOUNTER — TRANSCRIPTION ENCOUNTER (OUTPATIENT)
Age: 78
End: 2023-09-27

## 2023-09-27 VITALS
HEART RATE: 80 BPM | OXYGEN SATURATION: 99 % | TEMPERATURE: 98 F | DIASTOLIC BLOOD PRESSURE: 94 MMHG | RESPIRATION RATE: 19 BRPM | SYSTOLIC BLOOD PRESSURE: 152 MMHG

## 2023-09-27 PROBLEM — Z87.19 PERSONAL HISTORY OF OTHER DISEASES OF THE DIGESTIVE SYSTEM: Chronic | Status: ACTIVE | Noted: 2023-09-15

## 2023-09-27 LAB — GLUCOSE BLDC GLUCOMTR-MCNC: 142 MG/DL — HIGH (ref 70–99)

## 2023-09-27 PROCEDURE — 99239 HOSP IP/OBS DSCHRG MGMT >30: CPT

## 2023-09-27 PROCEDURE — 93010 ELECTROCARDIOGRAM REPORT: CPT

## 2023-09-27 PROCEDURE — 99233 SBSQ HOSP IP/OBS HIGH 50: CPT

## 2023-09-27 RX ORDER — CLOPIDOGREL BISULFATE 75 MG/1
1 TABLET, FILM COATED ORAL
Qty: 30 | Refills: 1
Start: 2023-09-27 | End: 2023-11-25

## 2023-09-27 RX ORDER — ASPIRIN/CALCIUM CARB/MAGNESIUM 324 MG
1 TABLET ORAL
Qty: 30 | Refills: 1
Start: 2023-09-27 | End: 2023-11-25

## 2023-09-27 RX ADMIN — Medication 2 CAPSULE(S): at 10:25

## 2023-09-27 RX ADMIN — Medication 25 MILLIGRAM(S): at 05:17

## 2023-09-27 RX ADMIN — Medication 2 CAPSULE(S): at 11:47

## 2023-09-27 RX ADMIN — LATANOPROST 1 DROP(S): 0.05 SOLUTION/ DROPS OPHTHALMIC; TOPICAL at 05:57

## 2023-09-27 RX ADMIN — Medication 40 MILLIGRAM(S): at 05:18

## 2023-09-27 RX ADMIN — Medication 81 MILLIGRAM(S): at 11:47

## 2023-09-27 RX ADMIN — CLOPIDOGREL BISULFATE 75 MILLIGRAM(S): 75 TABLET, FILM COATED ORAL at 11:48

## 2023-09-27 RX ADMIN — Medication 2 SPRAY(S): at 05:57

## 2023-09-27 RX ADMIN — LOSARTAN POTASSIUM 50 MILLIGRAM(S): 100 TABLET, FILM COATED ORAL at 05:17

## 2023-09-27 NOTE — DISCHARGE NOTE NURSING/CASE MANAGEMENT/SOCIAL WORK - NSFLUVACAGEDISCH_IMM_ALL_CORE
Adult
Implemented All Universal Safety Interventions:  [place] to call system. Call bell, personal items and telephone within reach. Instruct patient to call for assistance. Room bathroom lighting operational. Non-slip footwear when patient is off stretcher. Physically safe environment: no spills, clutter or unnecessary equipment. Stretcher in lowest position, wheels locked, appropriate side rails in place.

## 2023-09-27 NOTE — DISCHARGE NOTE NURSING/CASE MANAGEMENT/SOCIAL WORK - NSDCPEFALRISK_GEN_ALL_CORE
For information on Fall & Injury Prevention, visit: [email or web address] OR  [email or web address] OR  https://www.cdc.gov/steadi/patient.html

## 2023-09-27 NOTE — PROGRESS NOTE ADULT - SUBJECTIVE AND OBJECTIVE BOX
INTERVAL HPI/OVERNIGHT EVENTS:  No acute tele events overnight  Patient SP Amulet implant POD#1  HD stable and feeling well    MEDICATIONS  (STANDING):  aspirin  chewable 81 milliGRAM(s) Oral daily  ceFAZolin   IVPB 2000 milliGRAM(s) IV Intermittent once  clopidogrel Tablet 75 milliGRAM(s) Oral daily  fluticasone propionate 50 MICROgram(s)/spray Nasal Spray 2 Spray(s) Both Nostrils two times a day  furosemide    Tablet 40 milliGRAM(s) Oral daily  latanoprost 0.005% Ophthalmic Solution 1 Drop(s) Both EYES at bedtime  losartan 50 milliGRAM(s) Oral daily  metoprolol tartrate 25 milliGRAM(s) Oral two times a day  pancrelipase  (CREON 12,000 Lipase Units) 2 Capsule(s) Oral three times a day with meals    MEDICATIONS  (PRN):  acetaminophen     Tablet .. 650 milliGRAM(s) Oral every 6 hours PRN Mild Pain (1 - 3)  acetaminophen     Tablet .. 650 milliGRAM(s) Oral every 6 hours PRN Moderate Pain (4 - 6)  HYDROmorphone  Injectable 0.5 milliGRAM(s) IV Push every 10 minutes PRN Moderate Pain (4 - 6)  melatonin 5 milliGRAM(s) Oral at bedtime PRN Sleep      Allergies    No Known Allergies    Intolerances        REVIEW OF SYSTEMS: No CP, palpitations, dizziness or SOB    Vital Signs Last 24 Hrs  T(C): 36.8 (27 Sep 2023 07:18), Max: 36.8 (27 Sep 2023 07:18)  T(F): 98.3 (27 Sep 2023 07:18), Max: 98.3 (27 Sep 2023 07:18)  HR: 80 (27 Sep 2023 07:18) (72 - 80)  BP: 152/94 (27 Sep 2023 07:18) (147/72 - 173/87)  BP(mean): 118 (27 Sep 2023 07:18) (103 - 119)  RR: 19 (27 Sep 2023 07:18) (17 - 20)  SpO2: 99% (27 Sep 2023 07:18) (95% - 99%)        09-26-23 @ 07:01  -  09-27-23 @ 07:00  --------------------------------------------------------  IN: 236 mL / OUT: 1350 mL / NET: -1114 mL    09-27-23 @ 07:01  -  09-27-23 @ 11:20  --------------------------------------------------------  IN: 327 mL / OUT: 600 mL / NET: -273 mL        Physical Exam  GENERAL: In no apparent distress, well nourished, and hydrated.  EYES: EOMI, PERRLA, conjunctiva and sclera clear  NECK: Supple  HEART: Regular rate and rhythm; No murmurs, rubs, or gallops.  PULMONARY: Clear to auscultation and perfusion.  No rales, wheezing, or rhonchi bilaterally.  EXTREMITIES:  2+ Peripheral Pulses, No clubbing, cyanosis, or edema  SKIN: Sutures removed from groins, no hematoma  NEUROLOGICAL: Grossly nonfocal    LABS:                RADIOLOGY & ADDITIONAL TESTS:

## 2023-09-27 NOTE — DISCHARGE NOTE NURSING/CASE MANAGEMENT/SOCIAL WORK - PATIENT PORTAL LINK FT
You can access the FollowMyHealth Patient Portal offered by [facility] by registering at the following website: [email or web address]. By joining [employer]’s FollowMyHealth portal, you will also be able to view your health information using other applications (apps) compatible with our system.

## 2023-09-27 NOTE — PROGRESS NOTE ADULT - NS ATTEND AMEND GEN_ALL_CORE FT
s/p Amulet    DC eliquis  ASA, Plavix  DC home  Education provided. Bexarotene Pregnancy And Lactation Text: This medication is Pregnancy Category X and should not be given to women who are pregnant or may become pregnant. This medication should not be used if you are breast feeding.

## 2023-09-27 NOTE — PROGRESS NOTE ADULT - ASSESSMENT
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 POD#1 and feeling well    Impression:  AF sp Amulet  Hx GIB  Hx Ischemic CVA  CAD  DM  HTN  HLD    Plan:  - Discontinue Eliquis  - Start ASA 81 mg and Plavix 75mg  - Cont all other home medications  - No heavy lifting or squatting for one week  - Follow up with Dr Thomson in one month

## 2023-09-29 DIAGNOSIS — I48.19 OTHER PERSISTENT ATRIAL FIBRILLATION: ICD-10-CM

## 2023-09-29 DIAGNOSIS — I69.328 OTHER SPEECH AND LANGUAGE DEFICITS FOLLOWING CEREBRAL INFARCTION: ICD-10-CM

## 2023-09-29 DIAGNOSIS — E78.5 HYPERLIPIDEMIA, UNSPECIFIED: ICD-10-CM

## 2023-09-29 DIAGNOSIS — I12.9 HYPERTENSIVE CHRONIC KIDNEY DISEASE WITH STAGE 1 THROUGH STAGE 4 CHRONIC KIDNEY DISEASE, OR UNSPECIFIED CHRONIC KIDNEY DISEASE: ICD-10-CM

## 2023-09-29 DIAGNOSIS — E11.22 TYPE 2 DIABETES MELLITUS WITH DIABETIC CHRONIC KIDNEY DISEASE: ICD-10-CM

## 2023-09-29 DIAGNOSIS — N18.9 CHRONIC KIDNEY DISEASE, UNSPECIFIED: ICD-10-CM

## 2023-09-29 DIAGNOSIS — Z79.84 LONG TERM (CURRENT) USE OF ORAL HYPOGLYCEMIC DRUGS: ICD-10-CM

## 2023-09-29 DIAGNOSIS — I25.10 ATHEROSCLEROTIC HEART DISEASE OF NATIVE CORONARY ARTERY WITHOUT ANGINA PECTORIS: ICD-10-CM

## 2023-10-26 ENCOUNTER — APPOINTMENT (OUTPATIENT)
Dept: ELECTROPHYSIOLOGY | Facility: CLINIC | Age: 78
End: 2023-10-26
Payer: MEDICARE

## 2023-10-26 VITALS
WEIGHT: 232 LBS | HEIGHT: 71 IN | SYSTOLIC BLOOD PRESSURE: 123 MMHG | BODY MASS INDEX: 32.48 KG/M2 | HEART RATE: 87 BPM | DIASTOLIC BLOOD PRESSURE: 77 MMHG | TEMPERATURE: 98 F

## 2023-10-26 PROCEDURE — 99214 OFFICE O/P EST MOD 30 MIN: CPT | Mod: 25

## 2023-10-26 PROCEDURE — 93000 ELECTROCARDIOGRAM COMPLETE: CPT

## 2023-10-26 RX ORDER — CLOPIDOGREL BISULFATE 75 MG/1
75 TABLET, FILM COATED ORAL
Qty: 30 | Refills: 0 | Status: ACTIVE | COMMUNITY

## 2023-10-26 RX ORDER — APIXABAN 5 MG/1
5 TABLET, FILM COATED ORAL
Qty: 60 | Refills: 3 | Status: COMPLETED | COMMUNITY
End: 2023-10-26

## 2023-11-02 ENCOUNTER — NON-APPOINTMENT (OUTPATIENT)
Age: 78
End: 2023-11-02

## 2023-11-13 ENCOUNTER — OUTPATIENT (OUTPATIENT)
Dept: OUTPATIENT SERVICES | Facility: HOSPITAL | Age: 78
LOS: 1 days | End: 2023-11-13
Payer: MEDICARE

## 2023-11-13 VITALS
TEMPERATURE: 99 F | SYSTOLIC BLOOD PRESSURE: 138 MMHG | HEIGHT: 71 IN | HEART RATE: 81 BPM | OXYGEN SATURATION: 97 % | RESPIRATION RATE: 14 BRPM | DIASTOLIC BLOOD PRESSURE: 79 MMHG | WEIGHT: 239.42 LBS

## 2023-11-13 DIAGNOSIS — Z98.890 OTHER SPECIFIED POSTPROCEDURAL STATES: Chronic | ICD-10-CM

## 2023-11-13 DIAGNOSIS — Z01.818 ENCOUNTER FOR OTHER PREPROCEDURAL EXAMINATION: ICD-10-CM

## 2023-11-13 DIAGNOSIS — I48.0 PAROXYSMAL ATRIAL FIBRILLATION: ICD-10-CM

## 2023-11-13 DIAGNOSIS — Z90.89 ACQUIRED ABSENCE OF OTHER ORGANS: Chronic | ICD-10-CM

## 2023-11-13 LAB
A1C WITH ESTIMATED AVERAGE GLUCOSE RESULT: 6.8 % — HIGH (ref 4–5.6)
A1C WITH ESTIMATED AVERAGE GLUCOSE RESULT: 6.8 % — HIGH (ref 4–5.6)
ALBUMIN SERPL ELPH-MCNC: 3.8 G/DL — SIGNIFICANT CHANGE UP (ref 3.5–5.2)
ALBUMIN SERPL ELPH-MCNC: 3.8 G/DL — SIGNIFICANT CHANGE UP (ref 3.5–5.2)
ALP SERPL-CCNC: 109 U/L — SIGNIFICANT CHANGE UP (ref 30–115)
ALP SERPL-CCNC: 109 U/L — SIGNIFICANT CHANGE UP (ref 30–115)
ALT FLD-CCNC: 9 U/L — SIGNIFICANT CHANGE UP (ref 0–41)
ALT FLD-CCNC: 9 U/L — SIGNIFICANT CHANGE UP (ref 0–41)
ANION GAP SERPL CALC-SCNC: 12 MMOL/L — SIGNIFICANT CHANGE UP (ref 7–14)
ANION GAP SERPL CALC-SCNC: 12 MMOL/L — SIGNIFICANT CHANGE UP (ref 7–14)
APTT BLD: 30.9 SEC — SIGNIFICANT CHANGE UP (ref 27–39.2)
APTT BLD: 30.9 SEC — SIGNIFICANT CHANGE UP (ref 27–39.2)
AST SERPL-CCNC: 15 U/L — SIGNIFICANT CHANGE UP (ref 0–41)
AST SERPL-CCNC: 15 U/L — SIGNIFICANT CHANGE UP (ref 0–41)
BASOPHILS # BLD AUTO: 0.07 K/UL — SIGNIFICANT CHANGE UP (ref 0–0.2)
BASOPHILS # BLD AUTO: 0.07 K/UL — SIGNIFICANT CHANGE UP (ref 0–0.2)
BASOPHILS NFR BLD AUTO: 0.8 % — SIGNIFICANT CHANGE UP (ref 0–1)
BASOPHILS NFR BLD AUTO: 0.8 % — SIGNIFICANT CHANGE UP (ref 0–1)
BILIRUB SERPL-MCNC: 0.6 MG/DL — SIGNIFICANT CHANGE UP (ref 0.2–1.2)
BILIRUB SERPL-MCNC: 0.6 MG/DL — SIGNIFICANT CHANGE UP (ref 0.2–1.2)
BUN SERPL-MCNC: 27 MG/DL — HIGH (ref 10–20)
BUN SERPL-MCNC: 27 MG/DL — HIGH (ref 10–20)
CALCIUM SERPL-MCNC: 8.8 MG/DL — SIGNIFICANT CHANGE UP (ref 8.4–10.5)
CALCIUM SERPL-MCNC: 8.8 MG/DL — SIGNIFICANT CHANGE UP (ref 8.4–10.5)
CHLORIDE SERPL-SCNC: 99 MMOL/L — SIGNIFICANT CHANGE UP (ref 98–110)
CHLORIDE SERPL-SCNC: 99 MMOL/L — SIGNIFICANT CHANGE UP (ref 98–110)
CO2 SERPL-SCNC: 29 MMOL/L — SIGNIFICANT CHANGE UP (ref 17–32)
CO2 SERPL-SCNC: 29 MMOL/L — SIGNIFICANT CHANGE UP (ref 17–32)
CREAT SERPL-MCNC: 1.5 MG/DL — SIGNIFICANT CHANGE UP (ref 0.7–1.5)
CREAT SERPL-MCNC: 1.5 MG/DL — SIGNIFICANT CHANGE UP (ref 0.7–1.5)
EGFR: 47 ML/MIN/1.73M2 — LOW
EGFR: 47 ML/MIN/1.73M2 — LOW
EOSINOPHIL # BLD AUTO: 0.29 K/UL — SIGNIFICANT CHANGE UP (ref 0–0.7)
EOSINOPHIL # BLD AUTO: 0.29 K/UL — SIGNIFICANT CHANGE UP (ref 0–0.7)
EOSINOPHIL NFR BLD AUTO: 3.3 % — SIGNIFICANT CHANGE UP (ref 0–8)
EOSINOPHIL NFR BLD AUTO: 3.3 % — SIGNIFICANT CHANGE UP (ref 0–8)
ESTIMATED AVERAGE GLUCOSE: 148 MG/DL — HIGH (ref 68–114)
ESTIMATED AVERAGE GLUCOSE: 148 MG/DL — HIGH (ref 68–114)
GLUCOSE SERPL-MCNC: 116 MG/DL — HIGH (ref 70–99)
GLUCOSE SERPL-MCNC: 116 MG/DL — HIGH (ref 70–99)
HCT VFR BLD CALC: 35.3 % — LOW (ref 42–52)
HCT VFR BLD CALC: 35.3 % — LOW (ref 42–52)
HGB BLD-MCNC: 10.6 G/DL — LOW (ref 14–18)
HGB BLD-MCNC: 10.6 G/DL — LOW (ref 14–18)
IMM GRANULOCYTES NFR BLD AUTO: 0.7 % — HIGH (ref 0.1–0.3)
IMM GRANULOCYTES NFR BLD AUTO: 0.7 % — HIGH (ref 0.1–0.3)
INR BLD: 1.22 RATIO — SIGNIFICANT CHANGE UP (ref 0.65–1.3)
INR BLD: 1.22 RATIO — SIGNIFICANT CHANGE UP (ref 0.65–1.3)
LYMPHOCYTES # BLD AUTO: 2.13 K/UL — SIGNIFICANT CHANGE UP (ref 1.2–3.4)
LYMPHOCYTES # BLD AUTO: 2.13 K/UL — SIGNIFICANT CHANGE UP (ref 1.2–3.4)
LYMPHOCYTES # BLD AUTO: 23.9 % — SIGNIFICANT CHANGE UP (ref 20.5–51.1)
LYMPHOCYTES # BLD AUTO: 23.9 % — SIGNIFICANT CHANGE UP (ref 20.5–51.1)
MCHC RBC-ENTMCNC: 26.1 PG — LOW (ref 27–31)
MCHC RBC-ENTMCNC: 26.1 PG — LOW (ref 27–31)
MCHC RBC-ENTMCNC: 30 G/DL — LOW (ref 32–37)
MCHC RBC-ENTMCNC: 30 G/DL — LOW (ref 32–37)
MCV RBC AUTO: 86.9 FL — SIGNIFICANT CHANGE UP (ref 80–94)
MCV RBC AUTO: 86.9 FL — SIGNIFICANT CHANGE UP (ref 80–94)
MONOCYTES # BLD AUTO: 0.81 K/UL — HIGH (ref 0.1–0.6)
MONOCYTES # BLD AUTO: 0.81 K/UL — HIGH (ref 0.1–0.6)
MONOCYTES NFR BLD AUTO: 9.1 % — SIGNIFICANT CHANGE UP (ref 1.7–9.3)
MONOCYTES NFR BLD AUTO: 9.1 % — SIGNIFICANT CHANGE UP (ref 1.7–9.3)
NEUTROPHILS # BLD AUTO: 5.55 K/UL — SIGNIFICANT CHANGE UP (ref 1.4–6.5)
NEUTROPHILS # BLD AUTO: 5.55 K/UL — SIGNIFICANT CHANGE UP (ref 1.4–6.5)
NEUTROPHILS NFR BLD AUTO: 62.2 % — SIGNIFICANT CHANGE UP (ref 42.2–75.2)
NEUTROPHILS NFR BLD AUTO: 62.2 % — SIGNIFICANT CHANGE UP (ref 42.2–75.2)
NRBC # BLD: 0 /100 WBCS — SIGNIFICANT CHANGE UP (ref 0–0)
NRBC # BLD: 0 /100 WBCS — SIGNIFICANT CHANGE UP (ref 0–0)
PLATELET # BLD AUTO: 284 K/UL — SIGNIFICANT CHANGE UP (ref 130–400)
PLATELET # BLD AUTO: 284 K/UL — SIGNIFICANT CHANGE UP (ref 130–400)
PMV BLD: 9.5 FL — SIGNIFICANT CHANGE UP (ref 7.4–10.4)
PMV BLD: 9.5 FL — SIGNIFICANT CHANGE UP (ref 7.4–10.4)
POTASSIUM SERPL-MCNC: 4 MMOL/L — SIGNIFICANT CHANGE UP (ref 3.5–5)
POTASSIUM SERPL-MCNC: 4 MMOL/L — SIGNIFICANT CHANGE UP (ref 3.5–5)
POTASSIUM SERPL-SCNC: 4 MMOL/L — SIGNIFICANT CHANGE UP (ref 3.5–5)
POTASSIUM SERPL-SCNC: 4 MMOL/L — SIGNIFICANT CHANGE UP (ref 3.5–5)
PROT SERPL-MCNC: 6.8 G/DL — SIGNIFICANT CHANGE UP (ref 6–8)
PROT SERPL-MCNC: 6.8 G/DL — SIGNIFICANT CHANGE UP (ref 6–8)
PROTHROM AB SERPL-ACNC: 13.9 SEC — HIGH (ref 9.95–12.87)
PROTHROM AB SERPL-ACNC: 13.9 SEC — HIGH (ref 9.95–12.87)
RBC # BLD: 4.06 M/UL — LOW (ref 4.7–6.1)
RBC # BLD: 4.06 M/UL — LOW (ref 4.7–6.1)
RBC # FLD: 16.2 % — HIGH (ref 11.5–14.5)
RBC # FLD: 16.2 % — HIGH (ref 11.5–14.5)
SODIUM SERPL-SCNC: 140 MMOL/L — SIGNIFICANT CHANGE UP (ref 135–146)
SODIUM SERPL-SCNC: 140 MMOL/L — SIGNIFICANT CHANGE UP (ref 135–146)
WBC # BLD: 8.91 K/UL — SIGNIFICANT CHANGE UP (ref 4.8–10.8)
WBC # BLD: 8.91 K/UL — SIGNIFICANT CHANGE UP (ref 4.8–10.8)
WBC # FLD AUTO: 8.91 K/UL — SIGNIFICANT CHANGE UP (ref 4.8–10.8)
WBC # FLD AUTO: 8.91 K/UL — SIGNIFICANT CHANGE UP (ref 4.8–10.8)

## 2023-11-13 PROCEDURE — 80053 COMPREHEN METABOLIC PANEL: CPT

## 2023-11-13 PROCEDURE — 36415 COLL VENOUS BLD VENIPUNCTURE: CPT

## 2023-11-13 PROCEDURE — 93010 ELECTROCARDIOGRAM REPORT: CPT

## 2023-11-13 PROCEDURE — 85610 PROTHROMBIN TIME: CPT

## 2023-11-13 PROCEDURE — 83036 HEMOGLOBIN GLYCOSYLATED A1C: CPT

## 2023-11-13 PROCEDURE — 85730 THROMBOPLASTIN TIME PARTIAL: CPT

## 2023-11-13 PROCEDURE — 85025 COMPLETE CBC W/AUTO DIFF WBC: CPT

## 2023-11-13 PROCEDURE — 93005 ELECTROCARDIOGRAM TRACING: CPT

## 2023-11-13 PROCEDURE — 99214 OFFICE O/P EST MOD 30 MIN: CPT | Mod: 25

## 2023-11-13 NOTE — H&P PST ADULT - HISTORY OF PRESENT ILLNESS
PT PRESENTS TO PAST WITH NO SOB, CP, PALPITATIONS, DYSURIA, UTI OR URI AT PRESENT.  AS PER THE PT, THIS IS HIS/HER COMPLETE MEDICAL AND SURGICAL HX, INCLUDING MEDICATIONS PRESCRIBED AND OVER THE COUNTER.  PT AMBULATES TO [facility] USING A WALKER.  pt denies any covid s/s,     yes  2020---   tested positive in the past  pt advised self quarantine till day of procedure  denies travel outside the [place] in the past 30 days  Anesthesia Alert  NO--Difficult Airway  NO--History of neck surgery or radiation  NO--Limited ROM of neck  NO--History of Malignant hyperthermia  NO--Personal or family history of Pseudocholinesterase deficiency  NO--Prior Anesthesia Complication  NO--Latex Allergy  NO--Loose teeth  NO--History of Rheumatoid Arthritis  NO--PARBHAKAR  yes-- BLEEDING RISK  - PLAVIX & ASPIRIN   NO--Other_____  D[facility] Activity Status Index (DASI) from [employer]  on 11/13/2023  ** All calculations should be rechecked by clinician prior to use **    RESULT SUMMARY:  9.95 points  The higher the score (maximum 58.2), the higher the functional status.    3.97 METs        INPUTS:  Take care of self —> 2.75 = Yes  Walk indoors —> 1.75 = Yes  Walk 1&ndash;2 blocks on level ground —> 2.75 = Yes  Climb a flight of stairs or walk up a hill —> 0 = No  Run a short distance —> 0 = No  Do light work around the house —> 2.7 = Yes  Do moderate work around the house —> 0 = No  Do heavy work around the house —> 0 = No  Do yardwork —> 0 = No  Have sexual relations —> 0 = No  Participate in moderate recreational activities —> 0 = No  Participate in strenuous sports —> 0 = No  Revised Cardiac Risk Index for Pre-Operative Risk from [employer]  on 11/13/2023  ** All calculations should be rechecked by clinician prior to use **    RESULT SUMMARY:  4 points  Class IV Risk    15.0 %  30-day risk of death, MI, or cardiac arrest    From Duceppe 2017, based on pooled data from 5 high quality external validations (4 prospective). These numbers are higher than those often quoted from the now-outdated original study (Etienne 1999). See Evidence for details.      INPUTS:  Elevated-risk surgery —> 0 = No  History of ischemic heart disease —> 1 = Yes  History of congestive heart failure —> 1 = Yes  History of cerebrovascular disease —> 1 = Yes  Pre-operative treatment with insulin —> 1 = Yes  Pre-operative creatinine >2 mg/dL / 176.8 µmol/L —> 0 = No

## 2023-11-13 NOTE — H&P PST ADULT - REASON FOR ADMISSION
Procedure: FABIÁN ONLY (POST AMULET)  Procedure: 60 Minutes  PT DX WITH PERSISTENT A- FIB.  PT DENIES HAVING ANY PAIN.

## 2023-11-13 NOTE — H&P PST ADULT - NSICDXPASTMEDICALHX_GEN_ALL_CORE_FT
PAST MEDICAL HISTORY:  Afib     Chronic kidney disease, unspecified     CVA (cerebral vascular accident)     Diabetes     Gout     History of rectal bleeding     HLD (hyperlipidemia)     HTN (hypertension)     Obesity      PAST MEDICAL HISTORY:  Afib     Chronic kidney disease, unspecified     CVA (cerebral vascular accident)     Diabetes     Gout     H/O: glaucoma     History of rectal bleeding     HLD (hyperlipidemia)     HTN (hypertension)     Obesity

## 2023-11-14 DIAGNOSIS — I48.0 PAROXYSMAL ATRIAL FIBRILLATION: ICD-10-CM

## 2023-11-14 DIAGNOSIS — Z01.818 ENCOUNTER FOR OTHER PREPROCEDURAL EXAMINATION: ICD-10-CM

## 2023-11-17 ENCOUNTER — OUTPATIENT (OUTPATIENT)
Dept: OUTPATIENT SERVICES | Facility: HOSPITAL | Age: 78
LOS: 1 days | End: 2023-11-17
Payer: MEDICARE

## 2023-11-17 VITALS
HEART RATE: 75 BPM | DIASTOLIC BLOOD PRESSURE: 86 MMHG | RESPIRATION RATE: 18 BRPM | OXYGEN SATURATION: 94 % | SYSTOLIC BLOOD PRESSURE: 142 MMHG

## 2023-11-17 DIAGNOSIS — I48.0 PAROXYSMAL ATRIAL FIBRILLATION: ICD-10-CM

## 2023-11-17 DIAGNOSIS — Z98.890 OTHER SPECIFIED POSTPROCEDURAL STATES: Chronic | ICD-10-CM

## 2023-11-17 DIAGNOSIS — Z95.818 PRESENCE OF OTHER CARDIAC IMPLANTS AND GRAFTS: Chronic | ICD-10-CM

## 2023-11-17 DIAGNOSIS — Z90.89 ACQUIRED ABSENCE OF OTHER ORGANS: Chronic | ICD-10-CM

## 2023-11-17 PROCEDURE — 93312 ECHO TRANSESOPHAGEAL: CPT | Mod: 26,XU

## 2023-11-17 PROCEDURE — 93312 ECHO TRANSESOPHAGEAL: CPT

## 2023-11-17 PROCEDURE — 93325 DOPPLER ECHO COLOR FLOW MAPG: CPT | Mod: 26

## 2023-11-17 PROCEDURE — 93325 DOPPLER ECHO COLOR FLOW MAPG: CPT

## 2023-11-17 PROCEDURE — 93320 DOPPLER ECHO COMPLETE: CPT | Mod: 26

## 2023-11-17 PROCEDURE — 93320 DOPPLER ECHO COMPLETE: CPT

## 2023-11-17 RX ORDER — FLUTICASONE PROPIONATE 50 MCG
2 SPRAY, SUSPENSION NASAL
Qty: 0 | Refills: 0 | DISCHARGE

## 2023-11-17 RX ORDER — LATANOPROST 0.05 MG/ML
1 SOLUTION/ DROPS OPHTHALMIC; TOPICAL
Qty: 0 | Refills: 0 | DISCHARGE

## 2023-11-17 RX ORDER — LOVASTATIN 20 MG
1 TABLET ORAL
Qty: 0 | Refills: 0 | DISCHARGE

## 2023-11-17 RX ORDER — METFORMIN HYDROCHLORIDE 850 MG/1
1 TABLET ORAL
Qty: 0 | Refills: 0 | DISCHARGE

## 2023-11-17 RX ORDER — LIPASE/PROTEASE/AMYLASE 16-48-48K
1 CAPSULE,DELAYED RELEASE (ENTERIC COATED) ORAL
Qty: 0 | Refills: 0 | DISCHARGE

## 2023-11-17 RX ORDER — LOSARTAN POTASSIUM 100 MG/1
1 TABLET, FILM COATED ORAL
Qty: 0 | Refills: 0 | DISCHARGE

## 2023-11-17 RX ORDER — METOPROLOL TARTRATE 50 MG
1 TABLET ORAL
Qty: 0 | Refills: 0 | DISCHARGE

## 2023-11-17 RX ORDER — FUROSEMIDE 40 MG
1 TABLET ORAL
Qty: 0 | Refills: 0 | DISCHARGE

## 2023-11-17 RX ORDER — SITAGLIPTIN 50 MG/1
1 TABLET, FILM COATED ORAL
Qty: 0 | Refills: 0 | DISCHARGE

## 2023-11-17 NOTE — PRE-ANESTHESIA EVALUATION ADULT - RESPIRATORY RATE (BREATHS/MIN)
Patient called with colonoscopy results of benign polyps and Dr. Thomason's recommendation for a 10 year follow up.  Patient verbalizes understanding.  
18

## 2023-11-17 NOTE — CHART NOTE - NSCHARTNOTEFT_GEN_A_CORE
POST OPERATIVE PROCEDURAL DOCUMENTATION  PRE-OP DIAGNOSIS: Check Amulet     POST-OP DIAGNOSIS: Amulet device appears well seated in an appropriate position with no evidence of leak or thrombus    PROCEDURE: Transesophageal Echocardiogram     Primary Physician: Dr. King  Cardiology Fellow: Dr Deejay Weber    ANESTHESIA TYPE  [  ] General Anesthesia  [ x ] Conscious Sedation  [  ] Local/Regional    CONDITION  [  ] Critical  [  ] Serious  [  ] Fair  [ x ] Good    SPECIMENS REMOVED (IF APPLICABLE): N/A    IMPLANTS (IF APPLICABLE): None    ESTIMATED BLOOD LOSS: None    COMPLICATIONS: None    After risks and benefits of procedures were explained, informed consent was obtained and placed in chart.   The patient received topical anesthetic to the oropharynx with viscous lidocaine and benzocaine spray.  Refer to Anesthesia note for sedation details.  The FABIÁN probe was passed into the esophagus without difficulty.  Transesophageal were obtained.  The FABIÁN probe was removed without difficulty and examined.  There was no evidence for bleeding.  The patient tolerated the procedure well without any immediate FABIÁN-related complications.      Preliminary Findings:  LA: Mildly to moderately enlarged  ROXI: Amulet device seen in the ROXI, well seated in an appropriate position with no evidence of leak or thrombus   LV: LVEF was estimated at 55-65%  MV: Mild to mod MR, No MS  AV: No AI, no  AS  RA: severely enlarged  RV: Normal size and function  TV: trace TR  PV: No IL, no PS  IAS: small shunt seen from right to left  Aorta: No atheroma was seen       DIAGNOSIS/IMPRESSION: suboptimal study with limited echocardiographic windows  Amulet device appears well seated in an appropriate position with no evidence of leak or thrombus    Full report to follow    PLAN OF CARE:  [x] Discharge home   [x] Continue aspirin and plavix  [x] No eating or drinking for 1 hour  [x] No driving for 24 hours  [x] f/u with Dr Thomson as OP    Results of procedure/ plan of care discussed with patient/  in detail.

## 2023-11-17 NOTE — PRE-ANESTHESIA EVALUATION ADULT - NSANTHAGERD_ENT_A_CORE
Physical Therapy    Visit Type: treatment  SUBJECTIVE  Patient agreed to participate in therapy this date.  Patient seen with video , [place], [place], [id number]    Patient participated in all scheduled therapy time this session.    Pain   Patient reports pain is not an issue/concern.    At onset of session (out of 10): 0     OBJECTIVE          Bed Mobility  - Supine to sit: supervision  - Sit to supine: moderate assist  Both times he got out of bed, the patient needed supervision for balance/safety, heavy use of the rail and increased time and UE support.  When getting into bed, he was too fatigued to raise the legs and needed help each time.    Transfers  Assistive devices: 2-wheeled walker, gait belt, 1 person  - Sit to stand: minimal assist, moderate assist  - Stand to sit: minimal assist, moderate assist  The patient completed 4 stands from the bed with the walker and with the bed elevated a few inches and the rails up to push from.  The patient failed one rep (the 2nd) to reach a full stand, but was able to reach a full stand on the others and to maintain it 15-20 seconds.      He was initially given a knee block, but on two of the trials, it was released and he still was able to stand.  He needed assist for rising, knee stability at times, and for eccentric control.      Interventions  Neuromuscular Re-Education  In sitting, the patient completed 1 set of 10 reps of  hip flexion, hip extension, hip abduction, hip adduction, knee flexion, and knee extension.  Knee flexion, hip abduction, adduction, and extension were resisted.  The patient performed 5 additional reps with 5 second holds for knee extension and hip flexion.    He also performed reaches with occasional assist when losing trunk control, and 10 reps of resisted trunk flexion and trunk extension    He had some dizziness after the exercises and was returned to supine for a few minutes (dizziness then resolved) before sitting back up  again.    Manual Therapy  A few minutes of soft tissue massage to the left quads.  Tolerated with mild discomfort, but also reported some improvement in pain after.         ASSESSMENT   Impairments: strength, balance deficits, safety awareness, activity tolerance, balance, cognition and pain  Functional Limitations: all functional mobility    The patient had one of his best sessions today, despite complaining that his legs were weaker today.  He completed multiple stands with the walker, most lasting 15-20 seconds, and some without knee support.  He also tolerated multiple sitting LE and trunk exercises.    The ATP appointment this AM had to be cancelled as family had been in for teaching and the discharge plan was still unknown.  Time was spent later in the day discussing the discharge plan with the team and a voicemail was left with [employer] to attempt to reschedule this Fri.         Discharge Recommendations  Recommendation for Discharge Location: PT WI: Intensive therapy/oversight of PMR physician  PT/OT Mobility Equipment for Discharge: owns ww, will need w/c if d/c to home.  ATP appointment set up for 8/14 at 13:00 - need to cancel if d/c is not for sure to home.  PT/OT ADL Equipment for Discharge: has tub chair, will monitior  PT Identified Barriers to Discharge: medical, decreased com/cog, decreased safety awareness, fall risk, assist with mobility        Pain at End of Session: RN informed on pain level  Pain: 5/10, location: left quads    Progress: slow progress, decreased activity tolerance    Education:   - Present and ready to learn: patient and patient's family  Education provided during session:  - See body of note.   - Results of above outlined education: Needs reinforcement    Patient at End of Session:   Location: in bed  Safety measures: call light within reach, alarm system in place/re-engaged, lines intact and equipment intact  Handoff to: nurse    PLAN   Suggestions for next session as  indicated:   Treatment plan for next session 8/15:continue family teaching whenever present; bring aide, progress bed mobility, unsupported sitting balance.  Trial standing from elevated bed with 2ww.  Bring and practice slideboard transfers the bed and w/c. Also practice bebe/maximove transfers with family,  standing in the stedy, marching in place/ pre-WW transfer training.  If the patient is moving well enough, have family attempt to assist with transfers.  Core and LE exercises. (cleared to work in the hallways, but not the gym by infection control).    Monitor back pain  Outcome measures: [place] Completed 7/28,  Additional treatment options:  Plan considerations:   See family details below for w/c ATP    Per the interpreters, there are no video interpreters for his language on the weekend, must use audio or phone interpreters.  He can also speak [place] if Suzy is not available.    Family/care partner training details: Daughter has been trained in two sessions.  The patient's son is the one making most of the plans, and has been involved in one teaching session.     ATP appointment attempting to be set up again for Fri 8/18.    Therapy equipment in patient room: Tilt in space chair in room        Interventions: balance, bed mobility, functional transfer training, HEP train/position, neuromuscular re-education, gait training, patient/family training, equipment eval/education, safety education, stairs retraining, strengthening, compensatory technique education, ROM and continued evaluation   Frequency: 5-7 days per week   Frequency Comments: 90 minutes/day at least 5 days/ week   Duration:   Agreement to plan and goals: patient agrees with goals and treatment plan      GOALS  Review date: 8/16/2023  Short Term Goals (STGs): to be met 7 days from date established, unless otherwise stated.   - Patient will complete all bed mobility at supervision level   - Patient will perform sit to/from stand at moderate assist  level   - Patient will perform stand pivot transfers at moderate assist level with 2 wheeled walker   - Patient will ambulate 20 feet at dependent level with 2 wheeled walker   - Patient will negotiate 1 stair at dependent level with and 2 railings  Status: all STGs progressing  Long Term Goals (LTGs): to be met by discharge from rehab program.   - Patient will complete all bed mobility at supervision level   - Patient will perform sit to/from stand at supervision level   - Patient will perform stand pivot transfers at supervision level with 2 wheeled walker   - Patient will ambulate 50 feet at supervision level with 2 wheeled walker   - Patient will negotiate 7 stairs at minimal assist level with least restrictive assistive device and 1 railing  Status: all LTGs progressing    Documented in the chart in the following areas: Assessment/Plan.      Therapy procedure time and total treatment time can be found documented on the Time Entry flowsheet   Yes 5

## 2023-11-17 NOTE — ASU PATIENT PROFILE, ADULT - FALL HARM RISK - RISK INTERVENTIONS
Assistance OOB with selected safe patient handling equipment/Assistance with ambulation/Communicate Fall Risk and Risk Factors to all staff, patient, and family/Discuss with provider need for PT consult/Monitor gait and stability/Provide patient with walking aids - walker, cane, crutches/Reinforce activity limits and safety measures with patient and family/Sit up slowly, dangle for a short time, stand at bedside before walking/Use of alarms - bed, chair and/or voice tab/Visual Cue: Yellow wristband/Bed in lowest position, wheels locked, appropriate side rails in place/Call bell, personal items and telephone in reach/Instruct patient to call for assistance before getting out of bed or chair/Non-slip footwear when patient is out of bed/[place] to call system/Physically safe environment - no spills, clutter or unnecessary equipment/Purposeful Proactive Rounding/Room/bathroom lighting operational, light cord in reach

## 2023-11-18 DIAGNOSIS — I48.0 PAROXYSMAL ATRIAL FIBRILLATION: ICD-10-CM

## 2024-05-02 ENCOUNTER — APPOINTMENT (OUTPATIENT)
Dept: ELECTROPHYSIOLOGY | Facility: CLINIC | Age: 79
End: 2024-05-02
Payer: MEDICARE

## 2024-05-02 VITALS
BODY MASS INDEX: 34.36 KG/M2 | HEART RATE: 68 BPM | DIASTOLIC BLOOD PRESSURE: 80 MMHG | TEMPERATURE: 97.1 F | SYSTOLIC BLOOD PRESSURE: 132 MMHG | WEIGHT: 240 LBS | HEIGHT: 70 IN

## 2024-05-02 PROCEDURE — 93000 ELECTROCARDIOGRAM COMPLETE: CPT

## 2024-05-02 PROCEDURE — G2211 COMPLEX E/M VISIT ADD ON: CPT

## 2024-05-02 PROCEDURE — 99214 OFFICE O/P EST MOD 30 MIN: CPT

## 2024-05-02 RX ORDER — SITAGLIPTIN 100 MG/1
100 TABLET, FILM COATED ORAL DAILY
Refills: 0 | Status: ACTIVE | COMMUNITY

## 2024-05-02 RX ORDER — METOLAZONE 2.5 MG/1
2.5 TABLET ORAL
Refills: 0 | Status: ACTIVE | COMMUNITY

## 2024-05-02 RX ORDER — PANCRELIPASE 120000; 24000; 76000 [USP'U]/1; [USP'U]/1; [USP'U]/1
24000-76000 CAPSULE, DELAYED RELEASE PELLETS ORAL 3 TIMES DAILY
Refills: 0 | Status: ACTIVE | COMMUNITY

## 2024-05-02 RX ORDER — METOPROLOL TARTRATE 25 MG/1
25 TABLET, FILM COATED ORAL TWICE DAILY
Qty: 90 | Refills: 4 | Status: ACTIVE | COMMUNITY

## 2024-05-02 RX ORDER — FLUTICASONE PROPIONATE 50 UG/1
50 SPRAY, METERED NASAL
Refills: 0 | Status: ACTIVE | COMMUNITY

## 2024-05-02 RX ORDER — LOSARTAN POTASSIUM 50 MG/1
50 TABLET, FILM COATED ORAL DAILY
Qty: 90 | Refills: 3 | Status: ACTIVE | COMMUNITY

## 2024-05-02 RX ORDER — LOVASTATIN 20 MG/1
20 TABLET ORAL
Refills: 0 | Status: ACTIVE | COMMUNITY

## 2024-05-02 RX ORDER — FUROSEMIDE 40 MG/1
40 TABLET ORAL
Qty: 90 | Refills: 3 | Status: ACTIVE | COMMUNITY

## 2024-05-02 RX ORDER — ACETAMINOPHEN 325 MG/1
325 TABLET, FILM COATED ORAL EVERY 6 HOURS
Refills: 0 | Status: ACTIVE | COMMUNITY

## 2024-05-02 RX ORDER — METFORMIN HYDROCHLORIDE 500 MG/1
500 TABLET, COATED ORAL
Qty: 60 | Refills: 5 | Status: ACTIVE | COMMUNITY

## 2024-05-02 RX ORDER — LATANOPROST/PF 0.005 %
0.01 DROPS OPHTHALMIC (EYE)
Qty: 1 | Refills: 3 | Status: ACTIVE | COMMUNITY

## 2024-05-03 PROBLEM — Z86.69 PERSONAL HISTORY OF OTHER DISEASES OF THE NERVOUS SYSTEM AND SENSE ORGANS: Chronic | Status: ACTIVE | Noted: 2023-11-13

## 2024-05-03 PROBLEM — N18.9 CHRONIC KIDNEY DISEASE, UNSPECIFIED: Chronic | Status: ACTIVE | Noted: 2023-11-13

## 2024-05-06 ENCOUNTER — NON-APPOINTMENT (OUTPATIENT)
Age: 79
End: 2024-05-06

## 2024-05-08 NOTE — HISTORY OF PRESENT ILLNESS
[FreeTextEntry1] : Mr. 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  used to live in [place]. Now in [place]-post rehab. Wife with recent femur fracture post fall- currently in rehab at [facility]. Sister in law is his transport, who lives in [place].  + DONNELLY + Af diagnosed in 2000. Was 300+lbs at that time. Seen by Dr. Iyer- first time.  Admitted for LGIB, CVA @ [facility].  10/26/2023: S/p Amulet. No further bleeding. Off anticoagulation. Continues to take DAPT. Wife still in rehab.    05/02/2024: Feels fine. Denies any complaints.    Denies chest pain, shortness of breath, palpitation, dizziness or LOC except noted above.  EKG (05/02/2024): AF  EKG (10/26/2023): AF   EKG (07/06/23): AF 91, QRSd 98 Cardio: [place]  Prior cardio: [facility]I @ [place] (?Dr. Perez)

## 2024-05-08 NOTE — ADDENDUM
[FreeTextEntry1] : Caroline ROOT assisted in documentation on 05/08/2024 acting as a scribe for Dr. Rj Thomson.

## 2024-05-08 NOTE — ASSESSMENT
[FreeTextEntry1] : ## Persistent Atrial Fibrillation s/p Amulet  ## Lower GI bleeding  - CHADVASC: 6+ (Age, HTN, DM, CVA) - S/p Amulet. Off Eliquis.  - On DAPT.  - Will DC Plavix.  - Will do FABIÁN in 1 year. -  Discussed importance of risk factor management. - Sleep study/Management of PRABHAKAR - Diet/exercise/weight loss - Management of GERD if present - old records - Discussed rhythm control. At this moment, will continue with current management.   - RTC in 6 months.

## 2024-05-31 NOTE — PATIENT PROFILE ADULT - FUNCTIONAL ASSESSMENT - BASIC MOBILITY SCORE.
Called patient, spoke with: Patient regarding the results of the patients most recent Xray.  I advised Patient of Dr. Dhillon recommendations.   Patient did voice understanding      19

## 2024-11-14 ENCOUNTER — NON-APPOINTMENT (OUTPATIENT)
Age: 79
End: 2024-11-14

## 2024-11-14 ENCOUNTER — APPOINTMENT (OUTPATIENT)
Dept: ELECTROPHYSIOLOGY | Facility: CLINIC | Age: 79
End: 2024-11-14

## 2024-11-14 VITALS
TEMPERATURE: 97.1 F | DIASTOLIC BLOOD PRESSURE: 95 MMHG | HEIGHT: 71 IN | HEART RATE: 78 BPM | BODY MASS INDEX: 34.72 KG/M2 | WEIGHT: 248 LBS | SYSTOLIC BLOOD PRESSURE: 150 MMHG

## 2024-11-14 PROCEDURE — 93000 ELECTROCARDIOGRAM COMPLETE: CPT

## 2024-11-14 PROCEDURE — G2211 COMPLEX E/M VISIT ADD ON: CPT

## 2024-11-14 PROCEDURE — 99214 OFFICE O/P EST MOD 30 MIN: CPT

## 2024-11-14 RX ORDER — SACUBITRIL AND VALSARTAN 49; 51 MG/1; MG/1
49-51 TABLET, FILM COATED ORAL TWICE DAILY
Refills: 0 | Status: ACTIVE | COMMUNITY

## 2024-11-14 RX ORDER — POTASSIUM CHLORIDE 1500 MG/1
20 TABLET, EXTENDED RELEASE ORAL DAILY
Refills: 0 | Status: ACTIVE | COMMUNITY

## 2025-04-03 ENCOUNTER — APPOINTMENT (OUTPATIENT)
Facility: CLINIC | Age: 80
End: 2025-04-03